# Patient Record
Sex: FEMALE | Race: BLACK OR AFRICAN AMERICAN | NOT HISPANIC OR LATINO | Employment: UNEMPLOYED | URBAN - METROPOLITAN AREA
[De-identification: names, ages, dates, MRNs, and addresses within clinical notes are randomized per-mention and may not be internally consistent; named-entity substitution may affect disease eponyms.]

---

## 2023-10-18 ENCOUNTER — PREP FOR PROCEDURE (OUTPATIENT)
Dept: GYNECOLOGIC ONCOLOGY | Facility: HOSPITAL | Age: 28
End: 2023-10-18

## 2023-10-18 ENCOUNTER — HOSPITAL ENCOUNTER (INPATIENT)
Facility: HOSPITAL | Age: 28
LOS: 3 days | Discharge: HOME | End: 2023-10-21
Attending: EMERGENCY MEDICINE | Admitting: OBSTETRICS & GYNECOLOGY
Payer: MEDICAID

## 2023-10-18 ENCOUNTER — APPOINTMENT (OUTPATIENT)
Dept: RADIOLOGY | Facility: HOSPITAL | Age: 28
End: 2023-10-18
Payer: MEDICAID

## 2023-10-18 DIAGNOSIS — O00.80: ICD-10-CM

## 2023-10-18 DIAGNOSIS — O00.80: Primary | ICD-10-CM

## 2023-10-18 DIAGNOSIS — O00.80 OTHER ECTOPIC PREGNANCY WITHOUT INTRAUTERINE PREGNANCY (HHS-HCC): Primary | ICD-10-CM

## 2023-10-18 LAB
ABO GROUP (TYPE) IN BLOOD: NORMAL
ALBUMIN SERPL BCP-MCNC: 4.2 G/DL (ref 3.4–5)
ALP SERPL-CCNC: 80 U/L (ref 33–110)
ALT SERPL W P-5'-P-CCNC: 10 U/L (ref 7–45)
ANION GAP SERPL CALC-SCNC: 15 MMOL/L (ref 10–20)
ANTIBODY SCREEN: NORMAL
APPEARANCE UR: ABNORMAL
AST SERPL W P-5'-P-CCNC: 29 U/L (ref 9–39)
B-HCG SERPL-ACNC: 8277 MIU/ML
BASOPHILS # BLD AUTO: 0.05 X10*3/UL (ref 0–0.1)
BASOPHILS NFR BLD AUTO: 0.5 %
BILIRUB SERPL-MCNC: 0.5 MG/DL (ref 0–1.2)
BILIRUB UR STRIP.AUTO-MCNC: NEGATIVE MG/DL
BUN SERPL-MCNC: 12 MG/DL (ref 6–23)
CALCIUM SERPL-MCNC: 8.8 MG/DL (ref 8.6–10.6)
CHLORIDE SERPL-SCNC: 105 MMOL/L (ref 98–107)
CO2 SERPL-SCNC: 19 MMOL/L (ref 21–32)
COLOR UR: YELLOW
CREAT SERPL-MCNC: 0.56 MG/DL (ref 0.5–1.05)
EOSINOPHIL # BLD AUTO: 0.22 X10*3/UL (ref 0–0.7)
EOSINOPHIL NFR BLD AUTO: 2.3 %
ERYTHROCYTE [DISTWIDTH] IN BLOOD BY AUTOMATED COUNT: 12.3 % (ref 11.5–14.5)
GFR SERPL CREATININE-BSD FRML MDRD: >90 ML/MIN/1.73M*2
GLUCOSE SERPL-MCNC: 106 MG/DL (ref 74–99)
GLUCOSE UR STRIP.AUTO-MCNC: NEGATIVE MG/DL
HCT VFR BLD AUTO: 33.7 % (ref 36–46)
HGB BLD-MCNC: 11.6 G/DL (ref 12–16)
IMM GRANULOCYTES # BLD AUTO: 0.03 X10*3/UL (ref 0–0.7)
IMM GRANULOCYTES NFR BLD AUTO: 0.3 % (ref 0–0.9)
KETONES UR STRIP.AUTO-MCNC: NEGATIVE MG/DL
LEUKOCYTE ESTERASE UR QL STRIP.AUTO: ABNORMAL
LYMPHOCYTES # BLD AUTO: 2.85 X10*3/UL (ref 1.2–4.8)
LYMPHOCYTES NFR BLD AUTO: 29.4 %
MCH RBC QN AUTO: 30.8 PG (ref 26–34)
MCHC RBC AUTO-ENTMCNC: 34.4 G/DL (ref 32–36)
MCV RBC AUTO: 89 FL (ref 80–100)
MONOCYTES # BLD AUTO: 0.72 X10*3/UL (ref 0.1–1)
MONOCYTES NFR BLD AUTO: 7.4 %
MUCOUS THREADS #/AREA URNS AUTO: ABNORMAL /LPF
NEUTROPHILS # BLD AUTO: 5.84 X10*3/UL (ref 1.2–7.7)
NEUTROPHILS NFR BLD AUTO: 60.1 %
NITRITE UR QL STRIP.AUTO: NEGATIVE
NRBC BLD-RTO: 0 /100 WBCS (ref 0–0)
PH UR STRIP.AUTO: 6 [PH]
PLATELET # BLD AUTO: 241 X10*3/UL (ref 150–450)
PMV BLD AUTO: 10.2 FL (ref 7.5–11.5)
POTASSIUM SERPL-SCNC: 4.3 MMOL/L (ref 3.5–5.3)
POTASSIUM SERPL-SCNC: 7.6 MMOL/L (ref 3.5–5.3)
PROT SERPL-MCNC: 7.6 G/DL (ref 6.4–8.2)
PROT UR STRIP.AUTO-MCNC: NEGATIVE MG/DL
RBC # BLD AUTO: 3.77 X10*6/UL (ref 4–5.2)
RBC # UR STRIP.AUTO: NEGATIVE /UL
RBC #/AREA URNS AUTO: ABNORMAL /HPF
RH FACTOR (ANTIGEN D): NORMAL
SODIUM SERPL-SCNC: 131 MMOL/L (ref 136–145)
SP GR UR STRIP.AUTO: 1.03
SQUAMOUS #/AREA URNS AUTO: ABNORMAL /HPF
UROBILINOGEN UR STRIP.AUTO-MCNC: <2 MG/DL
WBC # BLD AUTO: 9.7 X10*3/UL (ref 4.4–11.3)
WBC #/AREA URNS AUTO: ABNORMAL /HPF

## 2023-10-18 PROCEDURE — 99285 EMERGENCY DEPT VISIT HI MDM: CPT | Performed by: EMERGENCY MEDICINE

## 2023-10-18 PROCEDURE — 36415 COLL VENOUS BLD VENIPUNCTURE: CPT | Mod: CMCLAB | Performed by: EMERGENCY MEDICINE

## 2023-10-18 PROCEDURE — 76817 TRANSVAGINAL US OBSTETRIC: CPT | Performed by: RADIOLOGY

## 2023-10-18 PROCEDURE — 76817 TRANSVAGINAL US OBSTETRIC: CPT

## 2023-10-18 PROCEDURE — 84702 CHORIONIC GONADOTROPIN TEST: CPT | Performed by: PHYSICIAN ASSISTANT

## 2023-10-18 PROCEDURE — 85025 COMPLETE CBC W/AUTO DIFF WBC: CPT | Mod: CMCLAB | Performed by: PHYSICIAN ASSISTANT

## 2023-10-18 PROCEDURE — 96360 HYDRATION IV INFUSION INIT: CPT

## 2023-10-18 PROCEDURE — 81001 URINALYSIS AUTO W/SCOPE: CPT | Mod: CMCLAB | Performed by: PHYSICIAN ASSISTANT

## 2023-10-18 PROCEDURE — 2500000004 HC RX 250 GENERAL PHARMACY W/ HCPCS (ALT 636 FOR OP/ED): Performed by: PHYSICIAN ASSISTANT

## 2023-10-18 PROCEDURE — 36415 COLL VENOUS BLD VENIPUNCTURE: CPT | Mod: CMCLAB | Performed by: PHYSICIAN ASSISTANT

## 2023-10-18 PROCEDURE — 86901 BLOOD TYPING SEROLOGIC RH(D): CPT | Performed by: PHYSICIAN ASSISTANT

## 2023-10-18 PROCEDURE — 1210000001 HC SEMI-PRIVATE ROOM DAILY

## 2023-10-18 PROCEDURE — 87086 URINE CULTURE/COLONY COUNT: CPT | Performed by: PHYSICIAN ASSISTANT

## 2023-10-18 PROCEDURE — 99285 EMERGENCY DEPT VISIT HI MDM: CPT | Performed by: PHYSICIAN ASSISTANT

## 2023-10-18 PROCEDURE — 80053 COMPREHEN METABOLIC PANEL: CPT | Performed by: PHYSICIAN ASSISTANT

## 2023-10-18 PROCEDURE — 84132 ASSAY OF SERUM POTASSIUM: CPT | Performed by: EMERGENCY MEDICINE

## 2023-10-18 RX ORDER — GABAPENTIN 600 MG/1
600 TABLET ORAL ONCE
Status: CANCELLED | OUTPATIENT
Start: 2023-10-18 | End: 2023-10-18

## 2023-10-18 RX ORDER — CELECOXIB 50 MG/1
400 CAPSULE ORAL ONCE
Status: CANCELLED | OUTPATIENT
Start: 2023-10-18 | End: 2023-10-18

## 2023-10-18 RX ORDER — ONDANSETRON HYDROCHLORIDE 2 MG/ML
4 INJECTION, SOLUTION INTRAVENOUS EVERY 6 HOURS PRN
Status: DISCONTINUED | OUTPATIENT
Start: 2023-10-18 | End: 2023-10-21 | Stop reason: HOSPADM

## 2023-10-18 RX ORDER — SODIUM CHLORIDE, SODIUM LACTATE, POTASSIUM CHLORIDE, CALCIUM CHLORIDE 600; 310; 30; 20 MG/100ML; MG/100ML; MG/100ML; MG/100ML
40 INJECTION, SOLUTION INTRAVENOUS CONTINUOUS
Status: DISCONTINUED | OUTPATIENT
Start: 2023-10-18 | End: 2023-10-21 | Stop reason: HOSPADM

## 2023-10-18 RX ORDER — ACETAMINOPHEN 325 MG/1
650 TABLET ORAL EVERY 4 HOURS PRN
Status: DISCONTINUED | OUTPATIENT
Start: 2023-10-18 | End: 2023-10-20

## 2023-10-18 RX ORDER — ACETAMINOPHEN 325 MG/1
975 TABLET ORAL ONCE
Status: CANCELLED | OUTPATIENT
Start: 2023-10-18 | End: 2023-10-18

## 2023-10-18 RX ADMIN — SODIUM CHLORIDE, SODIUM LACTATE, POTASSIUM CHLORIDE, AND CALCIUM CHLORIDE 1000 ML: 600; 310; 30; 20 INJECTION, SOLUTION INTRAVENOUS at 21:50

## 2023-10-18 ASSESSMENT — COLUMBIA-SUICIDE SEVERITY RATING SCALE - C-SSRS
2. HAVE YOU ACTUALLY HAD ANY THOUGHTS OF KILLING YOURSELF?: NO
1. IN THE PAST MONTH, HAVE YOU WISHED YOU WERE DEAD OR WISHED YOU COULD GO TO SLEEP AND NOT WAKE UP?: NO
6. HAVE YOU EVER DONE ANYTHING, STARTED TO DO ANYTHING, OR PREPARED TO DO ANYTHING TO END YOUR LIFE?: NO

## 2023-10-19 ENCOUNTER — ANESTHESIA (OUTPATIENT)
Dept: OPERATING ROOM | Facility: HOSPITAL | Age: 28
End: 2023-10-19
Payer: MEDICAID

## 2023-10-19 ENCOUNTER — ANESTHESIA EVENT (OUTPATIENT)
Dept: OPERATING ROOM | Facility: HOSPITAL | Age: 28
End: 2023-10-19
Payer: MEDICAID

## 2023-10-19 DIAGNOSIS — O00.80 OTHER ECTOPIC PREGNANCY WITHOUT INTRAUTERINE PREGNANCY (HHS-HCC): Primary | ICD-10-CM

## 2023-10-19 LAB
ALBUMIN SERPL BCP-MCNC: 3.9 G/DL (ref 3.4–5)
ALP SERPL-CCNC: 63 U/L (ref 33–110)
ALT SERPL W P-5'-P-CCNC: 6 U/L (ref 7–45)
ANION GAP SERPL CALC-SCNC: 17 MMOL/L (ref 10–20)
AST SERPL W P-5'-P-CCNC: 10 U/L (ref 9–39)
B-HCG SERPL-ACNC: 5857 MIU/ML
BILIRUB SERPL-MCNC: 0.5 MG/DL (ref 0–1.2)
BUN SERPL-MCNC: 8 MG/DL (ref 6–23)
CALCIUM SERPL-MCNC: 8.6 MG/DL (ref 8.6–10.6)
CARDIAC TROPONIN I PNL SERPL HS: <3 NG/L (ref 0–34)
CHLORIDE SERPL-SCNC: 106 MMOL/L (ref 98–107)
CO2 SERPL-SCNC: 19 MMOL/L (ref 21–32)
CREAT SERPL-MCNC: 0.65 MG/DL (ref 0.5–1.05)
ERYTHROCYTE [DISTWIDTH] IN BLOOD BY AUTOMATED COUNT: 13.3 % (ref 11.5–14.5)
GFR SERPL CREATININE-BSD FRML MDRD: >90 ML/MIN/1.73M*2
GLUCOSE BLD MANUAL STRIP-MCNC: 138 MG/DL (ref 74–99)
GLUCOSE SERPL-MCNC: 148 MG/DL (ref 74–99)
HCT VFR BLD AUTO: 32.6 % (ref 36–46)
HGB BLD-MCNC: 10.3 G/DL (ref 12–16)
MCH RBC QN AUTO: 30.2 PG (ref 26–34)
MCHC RBC AUTO-ENTMCNC: 31.6 G/DL (ref 32–36)
MCV RBC AUTO: 96 FL (ref 80–100)
NRBC BLD-RTO: 0 /100 WBCS (ref 0–0)
PLATELET # BLD AUTO: 235 X10*3/UL (ref 150–450)
PMV BLD AUTO: 9.9 FL (ref 7.5–11.5)
POTASSIUM SERPL-SCNC: 3.4 MMOL/L (ref 3.5–5.3)
PROT SERPL-MCNC: 6.2 G/DL (ref 6.4–8.2)
RBC # BLD AUTO: 3.41 X10*6/UL (ref 4–5.2)
SODIUM SERPL-SCNC: 139 MMOL/L (ref 136–145)
WBC # BLD AUTO: 13.5 X10*3/UL (ref 4.4–11.3)

## 2023-10-19 PROCEDURE — 3600000003 HC OR TIME - INITIAL BASE CHARGE - PROCEDURE LEVEL THREE: Performed by: OBSTETRICS & GYNECOLOGY

## 2023-10-19 PROCEDURE — 1210000001 HC SEMI-PRIVATE ROOM DAILY

## 2023-10-19 PROCEDURE — 3700000001 HC GENERAL ANESTHESIA TIME - INITIAL BASE CHARGE: Performed by: OBSTETRICS & GYNECOLOGY

## 2023-10-19 PROCEDURE — A4217 STERILE WATER/SALINE, 500 ML: HCPCS | Performed by: OBSTETRICS & GYNECOLOGY

## 2023-10-19 PROCEDURE — 84702 CHORIONIC GONADOTROPIN TEST: CPT | Performed by: STUDENT IN AN ORGANIZED HEALTH CARE EDUCATION/TRAINING PROGRAM

## 2023-10-19 PROCEDURE — 3600000008 HC OR TIME - EACH INCREMENTAL 1 MINUTE - PROCEDURE LEVEL THREE: Performed by: OBSTETRICS & GYNECOLOGY

## 2023-10-19 PROCEDURE — 2500000004 HC RX 250 GENERAL PHARMACY W/ HCPCS (ALT 636 FOR OP/ED): Performed by: ANESTHESIOLOGY

## 2023-10-19 PROCEDURE — 2780000003 HC OR 278 NO HCPCS: Performed by: OBSTETRICS & GYNECOLOGY

## 2023-10-19 PROCEDURE — 2500000004 HC RX 250 GENERAL PHARMACY W/ HCPCS (ALT 636 FOR OP/ED)

## 2023-10-19 PROCEDURE — 88302 TISSUE EXAM BY PATHOLOGIST: CPT | Mod: TC,SUR | Performed by: OBSTETRICS & GYNECOLOGY

## 2023-10-19 PROCEDURE — 96372 THER/PROPH/DIAG INJ SC/IM: CPT | Performed by: OBSTETRICS & GYNECOLOGY

## 2023-10-19 PROCEDURE — 7100000002 HC RECOVERY ROOM TIME - EACH INCREMENTAL 1 MINUTE: Performed by: OBSTETRICS & GYNECOLOGY

## 2023-10-19 PROCEDURE — 88302 TISSUE EXAM BY PATHOLOGIST: CPT | Performed by: PATHOLOGY

## 2023-10-19 PROCEDURE — P9045 ALBUMIN (HUMAN), 5%, 250 ML: HCPCS | Mod: JZ | Performed by: NURSE ANESTHETIST, CERTIFIED REGISTERED

## 2023-10-19 PROCEDURE — 2500000004 HC RX 250 GENERAL PHARMACY W/ HCPCS (ALT 636 FOR OP/ED): Mod: JZ | Performed by: NURSE ANESTHETIST, CERTIFIED REGISTERED

## 2023-10-19 PROCEDURE — 85027 COMPLETE CBC AUTOMATED: CPT

## 2023-10-19 PROCEDURE — 2500000004 HC RX 250 GENERAL PHARMACY W/ HCPCS (ALT 636 FOR OP/ED): Performed by: OBSTETRICS & GYNECOLOGY

## 2023-10-19 PROCEDURE — 2500000005 HC RX 250 GENERAL PHARMACY W/O HCPCS: Performed by: NURSE ANESTHETIST, CERTIFIED REGISTERED

## 2023-10-19 PROCEDURE — 3700000002 HC GENERAL ANESTHESIA TIME - EACH INCREMENTAL 1 MINUTE: Performed by: OBSTETRICS & GYNECOLOGY

## 2023-10-19 PROCEDURE — 82947 ASSAY GLUCOSE BLOOD QUANT: CPT | Mod: CMCLAB

## 2023-10-19 PROCEDURE — C1765 ADHESION BARRIER: HCPCS | Performed by: OBSTETRICS & GYNECOLOGY

## 2023-10-19 PROCEDURE — 10T24ZZ RESECTION OF PRODUCTS OF CONCEPTION, ECTOPIC, PERCUTANEOUS ENDOSCOPIC APPROACH: ICD-10-PCS | Performed by: OBSTETRICS & GYNECOLOGY

## 2023-10-19 PROCEDURE — 7100000001 HC RECOVERY ROOM TIME - INITIAL BASE CHARGE: Performed by: OBSTETRICS & GYNECOLOGY

## 2023-10-19 PROCEDURE — 84484 ASSAY OF TROPONIN QUANT: CPT | Mod: CMCLAB | Performed by: STUDENT IN AN ORGANIZED HEALTH CARE EDUCATION/TRAINING PROGRAM

## 2023-10-19 PROCEDURE — 88305 TISSUE EXAM BY PATHOLOGIST: CPT | Performed by: PATHOLOGY

## 2023-10-19 PROCEDURE — 2720000007 HC OR 272 NO HCPCS: Performed by: OBSTETRICS & GYNECOLOGY

## 2023-10-19 PROCEDURE — A4216 STERILE WATER/SALINE, 10 ML: HCPCS | Performed by: OBSTETRICS & GYNECOLOGY

## 2023-10-19 PROCEDURE — A59151 PR RX ECTOP PREG BY SCOPE,RMV TUBE/OVRY: Performed by: ANESTHESIOLOGY

## 2023-10-19 PROCEDURE — 93010 ELECTROCARDIOGRAM REPORT: CPT | Performed by: INTERNAL MEDICINE

## 2023-10-19 PROCEDURE — 2500000001 HC RX 250 WO HCPCS SELF ADMINISTERED DRUGS (ALT 637 FOR MEDICARE OP): Performed by: OBSTETRICS & GYNECOLOGY

## 2023-10-19 PROCEDURE — 36415 COLL VENOUS BLD VENIPUNCTURE: CPT | Mod: CMCLAB | Performed by: STUDENT IN AN ORGANIZED HEALTH CARE EDUCATION/TRAINING PROGRAM

## 2023-10-19 PROCEDURE — 59151 TREAT ECTOPIC PREGNANCY: CPT | Performed by: OBSTETRICS & GYNECOLOGY

## 2023-10-19 PROCEDURE — 0UT64ZZ RESECTION OF LEFT FALLOPIAN TUBE, PERCUTANEOUS ENDOSCOPIC APPROACH: ICD-10-PCS | Performed by: OBSTETRICS & GYNECOLOGY

## 2023-10-19 PROCEDURE — 2500000005 HC RX 250 GENERAL PHARMACY W/O HCPCS: Performed by: OBSTETRICS & GYNECOLOGY

## 2023-10-19 PROCEDURE — 80053 COMPREHEN METABOLIC PANEL: CPT

## 2023-10-19 PROCEDURE — A59151 PR RX ECTOP PREG BY SCOPE,RMV TUBE/OVRY: Performed by: NURSE ANESTHETIST, CERTIFIED REGISTERED

## 2023-10-19 PROCEDURE — 99140 ANES COMP EMERGENCY COND: CPT | Performed by: ANESTHESIOLOGY

## 2023-10-19 PROCEDURE — 2500000001 HC RX 250 WO HCPCS SELF ADMINISTERED DRUGS (ALT 637 FOR MEDICARE OP)

## 2023-10-19 DEVICE — ABSORBABLE ADHESION BARRIER
Type: IMPLANTABLE DEVICE | Site: UTERUS | Status: FUNCTIONAL
Brand: GYNECARE INTERCEED

## 2023-10-19 RX ORDER — FENTANYL CITRATE 50 UG/ML
25 INJECTION, SOLUTION INTRAMUSCULAR; INTRAVENOUS EVERY 5 MIN PRN
Status: DISCONTINUED | OUTPATIENT
Start: 2023-10-19 | End: 2023-10-19 | Stop reason: HOSPADM

## 2023-10-19 RX ORDER — ROCURONIUM BROMIDE 10 MG/ML
INJECTION, SOLUTION INTRAVENOUS AS NEEDED
Status: DISCONTINUED | OUTPATIENT
Start: 2023-10-19 | End: 2023-10-19

## 2023-10-19 RX ORDER — POLYETHYLENE GLYCOL 3350 17 G/17G
17 POWDER, FOR SOLUTION ORAL DAILY
Qty: 510 G | Refills: 0 | OUTPATIENT
Start: 2023-10-19

## 2023-10-19 RX ORDER — SODIUM CHLORIDE, SODIUM LACTATE, POTASSIUM CHLORIDE, CALCIUM CHLORIDE 600; 310; 30; 20 MG/100ML; MG/100ML; MG/100ML; MG/100ML
100 INJECTION, SOLUTION INTRAVENOUS CONTINUOUS
Status: DISCONTINUED | OUTPATIENT
Start: 2023-10-19 | End: 2023-10-19 | Stop reason: HOSPADM

## 2023-10-19 RX ORDER — ESMOLOL HYDROCHLORIDE 10 MG/ML
INJECTION INTRAVENOUS AS NEEDED
Status: DISCONTINUED | OUTPATIENT
Start: 2023-10-19 | End: 2023-10-19

## 2023-10-19 RX ORDER — SODIUM CHLORIDE 9 MG/ML
INJECTION, SOLUTION INTRAMUSCULAR; INTRAVENOUS; SUBCUTANEOUS AS NEEDED
Status: DISCONTINUED | OUTPATIENT
Start: 2023-10-19 | End: 2023-10-19 | Stop reason: HOSPADM

## 2023-10-19 RX ORDER — PROPOFOL 10 MG/ML
INJECTION, EMULSION INTRAVENOUS AS NEEDED
Status: DISCONTINUED | OUTPATIENT
Start: 2023-10-19 | End: 2023-10-19

## 2023-10-19 RX ORDER — KETOROLAC TROMETHAMINE 15 MG/ML
INJECTION, SOLUTION INTRAMUSCULAR; INTRAVENOUS AS NEEDED
Status: DISCONTINUED | OUTPATIENT
Start: 2023-10-19 | End: 2023-10-19

## 2023-10-19 RX ORDER — VASOPRESSIN 20 U/ML
INJECTION PARENTERAL CONTINUOUS PRN
Status: COMPLETED | OUTPATIENT
Start: 2023-10-19 | End: 2023-10-19

## 2023-10-19 RX ORDER — GABAPENTIN 600 MG/1
600 TABLET ORAL ONCE
Status: COMPLETED | OUTPATIENT
Start: 2023-10-19 | End: 2023-10-19

## 2023-10-19 RX ORDER — HYDROMORPHONE HYDROCHLORIDE 1 MG/ML
INJECTION, SOLUTION INTRAMUSCULAR; INTRAVENOUS; SUBCUTANEOUS AS NEEDED
Status: DISCONTINUED | OUTPATIENT
Start: 2023-10-19 | End: 2023-10-19

## 2023-10-19 RX ORDER — LIDOCAINE HYDROCHLORIDE 10 MG/ML
INJECTION INFILTRATION; PERINEURAL AS NEEDED
Status: DISCONTINUED | OUTPATIENT
Start: 2023-10-19 | End: 2023-10-19 | Stop reason: HOSPADM

## 2023-10-19 RX ORDER — OXYCODONE HYDROCHLORIDE 5 MG/1
5 TABLET ORAL EVERY 6 HOURS PRN
Qty: 10 TABLET | Refills: 0 | OUTPATIENT
Start: 2023-10-19

## 2023-10-19 RX ORDER — CELECOXIB 200 MG/1
400 CAPSULE ORAL ONCE
Status: COMPLETED | OUTPATIENT
Start: 2023-10-19 | End: 2023-10-19

## 2023-10-19 RX ORDER — LIDOCAINE HYDROCHLORIDE 10 MG/ML
0.1 INJECTION, SOLUTION EPIDURAL; INFILTRATION; INTRACAUDAL; PERINEURAL ONCE
Status: DISCONTINUED | OUTPATIENT
Start: 2023-10-19 | End: 2023-10-19 | Stop reason: HOSPADM

## 2023-10-19 RX ORDER — MIDAZOLAM HYDROCHLORIDE 1 MG/ML
INJECTION, SOLUTION INTRAMUSCULAR; INTRAVENOUS AS NEEDED
Status: DISCONTINUED | OUTPATIENT
Start: 2023-10-19 | End: 2023-10-19

## 2023-10-19 RX ORDER — ONDANSETRON HYDROCHLORIDE 2 MG/ML
4 INJECTION, SOLUTION INTRAVENOUS ONCE AS NEEDED
Status: DISCONTINUED | OUTPATIENT
Start: 2023-10-19 | End: 2023-10-19 | Stop reason: HOSPADM

## 2023-10-19 RX ORDER — DOXYCYCLINE 100 MG/10ML
INJECTION, POWDER, LYOPHILIZED, FOR SOLUTION INTRAVENOUS AS NEEDED
Status: DISCONTINUED | OUTPATIENT
Start: 2023-10-19 | End: 2023-10-19

## 2023-10-19 RX ORDER — ACETAMINOPHEN 325 MG/1
975 TABLET ORAL ONCE
Status: COMPLETED | OUTPATIENT
Start: 2023-10-19 | End: 2023-10-19

## 2023-10-19 RX ORDER — HYDROMORPHONE HYDROCHLORIDE 1 MG/ML
0.5 INJECTION, SOLUTION INTRAMUSCULAR; INTRAVENOUS; SUBCUTANEOUS EVERY 5 MIN PRN
Status: DISCONTINUED | OUTPATIENT
Start: 2023-10-19 | End: 2023-10-19 | Stop reason: HOSPADM

## 2023-10-19 RX ORDER — OXYCODONE HYDROCHLORIDE 5 MG/1
5 TABLET ORAL EVERY 4 HOURS PRN
Status: DISCONTINUED | OUTPATIENT
Start: 2023-10-19 | End: 2023-10-19 | Stop reason: HOSPADM

## 2023-10-19 RX ORDER — FENTANYL CITRATE 50 UG/ML
INJECTION, SOLUTION INTRAMUSCULAR; INTRAVENOUS AS NEEDED
Status: DISCONTINUED | OUTPATIENT
Start: 2023-10-19 | End: 2023-10-19

## 2023-10-19 RX ORDER — IBUPROFEN 600 MG/1
600 TABLET ORAL EVERY 6 HOURS PRN
Qty: 28 TABLET | Refills: 0 | OUTPATIENT
Start: 2023-10-19 | End: 2023-10-26

## 2023-10-19 RX ORDER — ALBUMIN HUMAN 50 G/1000ML
SOLUTION INTRAVENOUS AS NEEDED
Status: DISCONTINUED | OUTPATIENT
Start: 2023-10-19 | End: 2023-10-19

## 2023-10-19 RX ORDER — ACETAMINOPHEN 325 MG/1
1000 TABLET ORAL EVERY 6 HOURS PRN
Qty: 30 TABLET | Refills: 0 | OUTPATIENT
Start: 2023-10-19 | End: 2023-10-26

## 2023-10-19 RX ORDER — LIDOCAINE HYDROCHLORIDE 20 MG/ML
INJECTION, SOLUTION INFILTRATION; PERINEURAL AS NEEDED
Status: DISCONTINUED | OUTPATIENT
Start: 2023-10-19 | End: 2023-10-19

## 2023-10-19 RX ORDER — SODIUM CHLORIDE 0.9 G/100ML
IRRIGANT IRRIGATION AS NEEDED
Status: DISCONTINUED | OUTPATIENT
Start: 2023-10-19 | End: 2023-10-19 | Stop reason: HOSPADM

## 2023-10-19 RX ADMIN — DOXYCYCLINE 200 MG: 100 INJECTION, POWDER, LYOPHILIZED, FOR SOLUTION INTRAVENOUS at 09:55

## 2023-10-19 RX ADMIN — ACETAMINOPHEN 975 MG: 325 TABLET ORAL at 07:44

## 2023-10-19 RX ADMIN — HYDROMORPHONE HYDROCHLORIDE 0.5 MG: 1 INJECTION, SOLUTION INTRAMUSCULAR; INTRAVENOUS; SUBCUTANEOUS at 11:55

## 2023-10-19 RX ADMIN — SODIUM CHLORIDE, POTASSIUM CHLORIDE, SODIUM LACTATE AND CALCIUM CHLORIDE 40 ML/HR: 600; 310; 30; 20 INJECTION, SOLUTION INTRAVENOUS at 04:42

## 2023-10-19 RX ADMIN — LIDOCAINE HYDROCHLORIDE 100 MG: 20 INJECTION, SOLUTION INFILTRATION; PERINEURAL at 08:14

## 2023-10-19 RX ADMIN — GABAPENTIN 600 MG: 600 TABLET, FILM COATED ORAL at 07:45

## 2023-10-19 RX ADMIN — HYDROMORPHONE HYDROCHLORIDE 0.5 MG: 1 INJECTION, SOLUTION INTRAMUSCULAR; INTRAVENOUS; SUBCUTANEOUS at 12:33

## 2023-10-19 RX ADMIN — SODIUM CHLORIDE, SODIUM LACTATE, POTASSIUM CHLORIDE, AND CALCIUM CHLORIDE: 600; 310; 30; 20 INJECTION, SOLUTION INTRAVENOUS at 08:19

## 2023-10-19 RX ADMIN — ROCURONIUM BROMIDE 5 MG: 10 INJECTION, SOLUTION INTRAVENOUS at 10:02

## 2023-10-19 RX ADMIN — SUGAMMADEX 200 MG: 100 INJECTION, SOLUTION INTRAVENOUS at 10:59

## 2023-10-19 RX ADMIN — ALBUMIN (HUMAN) 250 ML: 12.5 SOLUTION INTRAVENOUS at 08:41

## 2023-10-19 RX ADMIN — CELECOXIB 400 MG: 200 CAPSULE ORAL at 07:44

## 2023-10-19 RX ADMIN — SODIUM CHLORIDE, POTASSIUM CHLORIDE, SODIUM LACTATE AND CALCIUM CHLORIDE 100 ML/HR: 600; 310; 30; 20 INJECTION, SOLUTION INTRAVENOUS at 11:23

## 2023-10-19 RX ADMIN — ESMOLOL HYDROCHLORIDE 10 MG: 10 INJECTION, SOLUTION INTRAVENOUS at 08:31

## 2023-10-19 RX ADMIN — FENTANYL CITRATE 50 MCG: 50 INJECTION, SOLUTION INTRAMUSCULAR; INTRAVENOUS at 08:14

## 2023-10-19 RX ADMIN — MIDAZOLAM 2 MG: 1 INJECTION INTRAMUSCULAR; INTRAVENOUS at 08:01

## 2023-10-19 RX ADMIN — ACETAMINOPHEN 650 MG: 325 TABLET ORAL at 19:53

## 2023-10-19 RX ADMIN — FENTANYL CITRATE 50 MCG: 50 INJECTION, SOLUTION INTRAMUSCULAR; INTRAVENOUS at 08:13

## 2023-10-19 RX ADMIN — SODIUM CHLORIDE 1000 ML: 0.9 INJECTION, SOLUTION INTRAVENOUS at 14:04

## 2023-10-19 RX ADMIN — PROPOFOL 200 MG: 10 INJECTION, EMULSION INTRAVENOUS at 08:14

## 2023-10-19 RX ADMIN — ONDANSETRON 4 MG: 2 INJECTION INTRAMUSCULAR; INTRAVENOUS at 10:11

## 2023-10-19 RX ADMIN — ROCURONIUM BROMIDE 50 MG: 10 INJECTION, SOLUTION INTRAVENOUS at 08:16

## 2023-10-19 RX ADMIN — ESMOLOL HYDROCHLORIDE 10 MG: 10 INJECTION, SOLUTION INTRAVENOUS at 10:28

## 2023-10-19 RX ADMIN — HYDROMORPHONE HYDROCHLORIDE 0.2 MG: 1 INJECTION, SOLUTION INTRAMUSCULAR; INTRAVENOUS; SUBCUTANEOUS at 11:14

## 2023-10-19 RX ADMIN — DEXAMETHASONE SODIUM PHOSPHATE 4 MG: 4 INJECTION, SOLUTION INTRAMUSCULAR; INTRAVENOUS at 08:44

## 2023-10-19 RX ADMIN — SODIUM CHLORIDE, SODIUM LACTATE, POTASSIUM CHLORIDE, AND CALCIUM CHLORIDE: 600; 310; 30; 20 INJECTION, SOLUTION INTRAVENOUS at 07:53

## 2023-10-19 RX ADMIN — HYDROMORPHONE HYDROCHLORIDE 0.2 MG: 1 INJECTION, SOLUTION INTRAMUSCULAR; INTRAVENOUS; SUBCUTANEOUS at 10:51

## 2023-10-19 RX ADMIN — KETOROLAC TROMETHAMINE 15 MG: 15 INJECTION, SOLUTION INTRAMUSCULAR; INTRAVENOUS at 09:37

## 2023-10-19 SDOH — SOCIAL STABILITY: SOCIAL INSECURITY: ARE THERE ANY APPARENT SIGNS OF INJURIES/BEHAVIORS THAT COULD BE RELATED TO ABUSE/NEGLECT?: NO

## 2023-10-19 SDOH — SOCIAL STABILITY: SOCIAL INSECURITY: HAS ANYONE EVER THREATENED TO HURT YOUR FAMILY OR YOUR PETS?: NO

## 2023-10-19 SDOH — SOCIAL STABILITY: SOCIAL INSECURITY: ABUSE: ADULT

## 2023-10-19 SDOH — SOCIAL STABILITY: SOCIAL INSECURITY: DOES ANYONE TRY TO KEEP YOU FROM HAVING/CONTACTING OTHER FRIENDS OR DOING THINGS OUTSIDE YOUR HOME?: NO

## 2023-10-19 SDOH — SOCIAL STABILITY: SOCIAL INSECURITY: ARE YOU OR HAVE YOU BEEN THREATENED OR ABUSED PHYSICALLY, EMOTIONALLY, OR SEXUALLY BY ANYONE?: NO

## 2023-10-19 SDOH — SOCIAL STABILITY: SOCIAL INSECURITY: WERE YOU ABLE TO COMPLETE ALL THE BEHAVIORAL HEALTH SCREENINGS?: YES

## 2023-10-19 SDOH — SOCIAL STABILITY: SOCIAL INSECURITY: HAVE YOU HAD THOUGHTS OF HARMING ANYONE ELSE?: NO

## 2023-10-19 SDOH — SOCIAL STABILITY: SOCIAL INSECURITY: DO YOU FEEL ANYONE HAS EXPLOITED OR TAKEN ADVANTAGE OF YOU FINANCIALLY OR OF YOUR PERSONAL PROPERTY?: NO

## 2023-10-19 SDOH — SOCIAL STABILITY: SOCIAL INSECURITY: DO YOU FEEL UNSAFE GOING BACK TO THE PLACE WHERE YOU ARE LIVING?: NO

## 2023-10-19 ASSESSMENT — PAIN SCALES - GENERAL
PAINLEVEL_OUTOF10: 0 - NO PAIN
PAINLEVEL_OUTOF10: 7
PAINLEVEL_OUTOF10: 7
PAINLEVEL_OUTOF10: 0 - NO PAIN
PAINLEVEL_OUTOF10: 4
PAINLEVEL_OUTOF10: 9
PAIN_LEVEL: 3
PAINLEVEL_OUTOF10: 0 - NO PAIN
PAINLEVEL_OUTOF10: 7
PAINLEVEL_OUTOF10: 7

## 2023-10-19 ASSESSMENT — ENCOUNTER SYMPTOMS
VOMITING: 0
WEAKNESS: 0
AGITATION: 0
LIGHT-HEADEDNESS: 0
NAUSEA: 0
CONFUSION: 0
ABDOMINAL PAIN: 0
SHORTNESS OF BREATH: 0
DIZZINESS: 0
BACK PAIN: 0
ABDOMINAL DISTENTION: 0

## 2023-10-19 ASSESSMENT — PAIN - FUNCTIONAL ASSESSMENT
PAIN_FUNCTIONAL_ASSESSMENT: 0-10

## 2023-10-19 ASSESSMENT — LIFESTYLE VARIABLES
HOW OFTEN DO YOU HAVE 6 OR MORE DRINKS ON ONE OCCASION: NEVER
SKIP TO QUESTIONS 9-10: 1
HOW MANY STANDARD DRINKS CONTAINING ALCOHOL DO YOU HAVE ON A TYPICAL DAY: PATIENT DOES NOT DRINK
HOW OFTEN DO YOU HAVE A DRINK CONTAINING ALCOHOL: NEVER
AUDIT-C TOTAL SCORE: 0
AUDIT-C TOTAL SCORE: 0

## 2023-10-19 ASSESSMENT — COGNITIVE AND FUNCTIONAL STATUS - GENERAL
DAILY ACTIVITIY SCORE: 24
PATIENT BASELINE BEDBOUND: NO

## 2023-10-19 ASSESSMENT — ACTIVITIES OF DAILY LIVING (ADL)
HEARING - LEFT EAR: FUNCTIONAL
ADEQUATE_TO_COMPLETE_ADL: YES
WALKS IN HOME: INDEPENDENT
LACK_OF_TRANSPORTATION: NO
HEARING - RIGHT EAR: FUNCTIONAL
PATIENT'S MEMORY ADEQUATE TO SAFELY COMPLETE DAILY ACTIVITIES?: YES
BATHING: INDEPENDENT
GROOMING: INDEPENDENT
JUDGMENT_ADEQUATE_SAFELY_COMPLETE_DAILY_ACTIVITIES: YES
FEEDING YOURSELF: INDEPENDENT
DRESSING YOURSELF: INDEPENDENT
TOILETING: INDEPENDENT

## 2023-10-19 ASSESSMENT — COLUMBIA-SUICIDE SEVERITY RATING SCALE - C-SSRS
6. HAVE YOU EVER DONE ANYTHING, STARTED TO DO ANYTHING, OR PREPARED TO DO ANYTHING TO END YOUR LIFE?: NO
2. HAVE YOU ACTUALLY HAD ANY THOUGHTS OF KILLING YOURSELF?: NO
1. IN THE PAST MONTH, HAVE YOU WISHED YOU WERE DEAD OR WISHED YOU COULD GO TO SLEEP AND NOT WAKE UP?: NO

## 2023-10-19 ASSESSMENT — PATIENT HEALTH QUESTIONNAIRE - PHQ9
2. FEELING DOWN, DEPRESSED OR HOPELESS: NOT AT ALL
1. LITTLE INTEREST OR PLEASURE IN DOING THINGS: NOT AT ALL
SUM OF ALL RESPONSES TO PHQ9 QUESTIONS 1 & 2: 0

## 2023-10-19 ASSESSMENT — PAIN DESCRIPTION - DESCRIPTORS: DESCRIPTORS: CRAMPING;SORE

## 2023-10-19 NOTE — ANESTHESIA POSTPROCEDURE EVALUATION
Patient: Dori Marroquin    Procedure Summary       Date: 10/19/23 Room / Location: [facility] OR 03 / [facility] OR    Anesthesia Start: 0757 Anesthesia Stop:     Procedure: Operatvie laparoscopy, Wedge resection for Ectopic Pregnancy, Left Salpingectomy, D&C (Left: Abdomen) Diagnosis:       Ectopic pregnancy of intramural myometrium      (Ectopic pregnancy of intramural myometrium [O00.80])    Surgeons: Paulette Damon MD Responsible Provider: Milton Majano MD    Anesthesia Type: general ASA Status: 2 - Emergent            Anesthesia Type: general    Vitals Value Taken Time   /62 10/19/23 1119   Temp 37.2 °C (99 °F) 10/19/23 1119   Pulse 113 10/19/23 1119   Resp 20 10/19/23 1119   SpO2 100 % 10/19/23 1119       Anesthesia Post Evaluation    Patient location during evaluation: bedside  Patient participation: complete - patient participated  Level of consciousness: awake and alert  Pain score: 3  Pain management: adequate  Airway patency: patent  Cardiovascular status: acceptable  Respiratory status: acceptable  Hydration status: acceptable        No notable events documented.

## 2023-10-19 NOTE — SIGNIFICANT EVENT
Rapid response   10/19/23 1402   Onset Documentation   Rapid Response Initiated By Rapid response RN   Pager Time 1402   Arrival Time 1408   Event End Time 1430   Primary Reason for Call Change in mental status;Staff concern     Rapid response called for concern of mental status- pt just returned from the OR where she received general anesthesia and pain med following. Per bedside nurse she was lethargic and slow to arouse, not answering questions. Upon arrival pt resting in bed and appears pale and shaking- warm blankets placed. Pt more awake and following commands and answering questions appropriately. Primary team at the bedside-pt receiving fluid bolus, labs obtained and pending and 12 lead ECG done. Pt slowly more awake. VSS-see flow sheet. Pt okay to remain on the floor for continued monitoring and will call with any addtl concerns.

## 2023-10-19 NOTE — DISCHARGE INSTRUCTIONS
Post-Operation Instructions  What care is needed at home?  Ask your doctor what you need to do when you go home. Make sure you ask questions if you do not understand what you need to do.  You can take off the bandaid covering your incision in 1-2 days. The skin tape covering your other incisions will fall off on its own or your Doctor will remove it for you.  You can shower, but do not scrub your incisions, let warm soapy water flow over them.  Do not lift things over 10 pounds (4.5 kg).  Do not put anything in your vagina until cleared by your provider: no sex, douching, tampons.  Be sure to wash your hands before and after touching your wound or dressing.  You can expect some bleeding from your vagina for a few weeks. You may use sanitary pads but not tampons.  Your bowel movements may take some time to get back to normal. Eat small meals high in fiber to avoid hard stools. Drink 6 to 8 glasses of water each day.  Try to walk each day. Start by walking a little more than you did the day before. Walking boosts blood flow and helps prevent lung, belly, and blood problems.  Talk with your doctor about safe sex as you can still be exposed to sexually transmitted diseases.  What follow-up care is needed?  We will call you to schedule a follow up visit with your surgeon. Be sure to keep your visits.  You have stitches. They will dissolve on their own. The surgical glue will fall off on its own as well.  What drugs may be needed?  We are sending you home with ibuprofen, tylenol, oxycodone (for pain), and miralax (a stool softener).   What problems could happen?  Infection  Wound opening  Heavy blood loss  Blood clots in your legs or lungs  Damage to your bowel, bladder, and other organs inside the belly  Trouble passing bowel movements  When do I need to call the doctor?  Signs of infection such as a fever of 100.4°F (38°C) or higher, chills, pain with passing urine.  Signs of wound infection such as swelling, redness,  warmth around the wound; too much pain when touched; yellowish, greenish, or bloody discharge; foul smell coming from the cut site; cut site opens up.  Excessive blood in your sanitary pads or more than six soaked pads in a day. (Spotting is normal).  Smelly green or dark yellow vaginal discharge  Upset stomach, throwing up, or very bad belly pain  Pain not helped by drugs you are taking  No bowel movement after 3 days  If you feel the need to pass urine but urine will not come out even after 6 hours  Swelling in your leg or arm that is much greater on one side than the other  Teach Back: Helping You Understand  The Teach Back Method helps you understand the information we are giving you. After you talk with the staff, tell them in your own words what you learned. This helps to make sure the staff has described each thing clearly. It also helps to explain things that may have been confusing. Before going home, make sure you can do these:  I can tell you about my procedure.  I can tell you how to care for my cut site.  I can tell you what I will do if I have a fever, chills, bad smelling drainage from the vagina, or bad belly pain.

## 2023-10-19 NOTE — OP NOTE
Date: 10/19/2023  OR Location: [facility] OR    Name: Dori Marroquin, : 1995, Age: 28 y.o., MRN: [id number], Sex: female    Diagnosis  Pre-op Diagnosis     * Ectopic pregnancy of intramural myometrium [O00.80] Post-op Diagnosis     * Ectopic pregnancy of intramural myometrium [O00.80]     Procedures  Operative Laparoscopy  Wedge resection of left cornual ectopic pregnancy  Left Salpingectomy  Dilation and curettage under laparoscopic guidance  Intraoperative ultrasound    Surgeons      * Paulette Damon - Primary     * Bonnie Kumar    Resident/Fellow/Other Assistant:  Surgeon(s) and Role:     * Tim Park MD - Assisting     * Radha Frye MD - Assisting     * Bonnie Kumar MD    Procedure Summary  Anesthesia: General  ASA: II  Anesthesia Staff: Anesthesiologist: Milton Majano MD  CRNA: Caroline Grimaldo, APRN-CRNA; Lynne Moore APRN-CRNA  Estimated Blood Loss: 10mL  Intra-op Medications:   Medication Name Total Dose   acetaminophen (Tylenol) tablet 975 mg 975 mg   celecoxib (CeleBREX) capsule 400 mg 400 mg   gabapentin (Neurontin) tablet 600 mg 600 mg              Anesthesia Record               Intraprocedure I/O Totals          Intake    .00 mL    Esmolol Drip 0.00 mL    The total shown is the total volume documented since Anesthesia Start was filed.    Total Intake 900 mL       Output    Urine 60 mL    Est. Blood Loss 10 mL    Total Output 70 mL       Net    Net Volume 830 mL          Specimen:   ID Type Source Tests Collected by Time   1 : LEFT FALLOPIAN TUBE Tissue FALLOPIAN TUBE SALPINGECTOMY LEFT SURGICAL PATHOLOGY EXAM Paulette Damon MD 10/19/2023 0923   2 : Cornual ectopic Tissue ECTOPIC PRODUCTS OF CONCEPTION (SPECIFY LOCATION) SURGICAL PATHOLOGY EXAM Paulette Damon MD 10/19/2023 0928   3 : Endometrial curettings Tissue PRODUCTS OF CONCEPTION SURGICAL PATHOLOGY EXAM Paulette Damno MD 10/19/2023 1019        Staff:   Circulator: Brittny Piper RN; Isabella Shipley RN;  Mimi Gutierrez RN  Scrub Person: Ragini Guzman    Indication: Patient was referred from  after presenting for care and had ultrasound findings concerning for cornual ectopic pregnancy measuring 6.5wga. Hcg 8277. Patient was asymptomatic without clinical findings concerning for ruptured ectopic.  Risks were discussed the patient, including bleeding, infection, and injury to surrounding structures.  Surgical consent was obtained.     Findings: Approximately 2-3cm left cornual ectopic pregnancy, at insertion site of left fallopian tube, without signs of rupture. Intraoperative transvaginal ultrasound confirmed presence of left cornual ectopic pregnancy. Grossly normal appearing right fallopian tube and bilateral ovaries.    Procedure Details: The patient was brought to the OR.  A time out was performed and general anesthesia was administered. The patient was then placed in the dorsal lithotomy position. Next, the patient was prepped and draped in the normal sterile fashion. A Castle catheter was placed. A sponge stick was placed in the vagina for manipulation.     The abdomen was entered without difficulty using open Zazueta technique and a 10mm trocar was placed. The camera was inserted and intraperitoneal placement confirmed.  Pneumoperitoneum was established with CO2 gas to a pressure of 18mmHg. An intraabdominal survey revealed normal appearing anatomy an no visceral or vascular injury. The patient was placed in steep Trendelenburg position to facilitate visualization in the pelvis. Three additional 5mm trocars were then inserted into the abdomen under direct visualization: two in the right lower quadrant and one in the left lower quadrant.     The uterus was elevated and carefully examined. The ectopic pregnancy was identified at the left uterine cornu, within close proximity to the left fallopian tube. There was no hemoperitoneum or signs of rupture. It was noted that the ectopic pregnancy was situated at  the junction of the left fallopian tube and the uterine cornu.  The right fallopian tube and bilateral ovaries appeared normal.      Dr. Park was called into the OR to assist with intraoperative ultrasound to further delineate the location of the ectopic. Intraoperative transvaginal ultrasound confirmed the presence of a left cornual ectopic pregnancy.     Because of its close proximity to the cornual ectopic, the decision was made to remove the left fallopian tube. The left fallopian tube was grasped at its fimbriated end. The underlying mesosalpinx was bisected with the ligasure devise starting at the fimbriated end and transected at the cornua. The left fallopian tube was then removed from the abdomen and sent to pathology for permanent section.     Attention was then turned to the left uterine cornu. 20 mL of dilute vasopressin was carefully injected into the myometrium surrounding the ectopic. Using the laparoscopic Bovie, the myometrium surrounding the ectopic was incised and cauterized until the left uterine cornu containing the pregnancy had been . Excellent hemostasis was noted.     The camera was exchanged for a 5-port and an endo-catch bag was used to remove the specimen containing the ectopic pregnancy from the umbilical port under direct visualization. The specimen was then sent to pathology for permanent section.     The sponge stick in the vagina was then removed. A speculum was placed in the vagina and a single tooth tenaculum was placed on the anterior lip of the cervix. The cervix was serially dilated using Colmenares dilators up to 21 Armenian.  A size 7 mm curved suction curette was connected to the suction, placed in the cervix and a suction curettage was performed under laparoscopic guidance. Minimal tissue was evacuated. The endometrial curettings were collected and sent to pathology for permanent section.     Attention was returned to the abdomen after donning new sterile gloves. The uterus  was again noted to be hemostatic. The resected area was copiously irrigated. The uterine defect was then closed in two layers. The myometrium was closed using a running stitch of 2-0 V-loc suture. The serosal layer was also closed using a running stitch of 2-0 V-loc suture. All needles were removed from the abdomen.  Interceed was placed over the left uterine cornu to reduce the risk of adhesion formation.      A final survey of the abdomen showed hemostasis. All port sites were removed.    The fascia was closed with 0-Vicryl with a figure of eight stitch and the skin incisions were closed with 4-0 Monocryl.    The patient tolerated the procedure well and all counts were correct x2. Dr. Damon was present for the entire procedure. The patient was taken to the PACU in stable condition. She will be admitted post-op with plan to repeat a serum hcg level today, tomorrow, and likely weekly until her hcg trends to zero. She will be followed in the beta book. She was counseled that pregnancy is not recommended for 6 months, and that in future pregnancies she will require delivery via  section.       Complications:  None; patient tolerated the procedure well.     Disposition: PACU - hemodynamically stable.  Condition: stable      I was present for the entirety of the procedure(s).     Paulette Damon MD

## 2023-10-19 NOTE — H&P
Interval Surgical Update  28 year old  presenting from  for concern for ectopic pregnancy found to have confirmed with [facility] TVUS for live ectopic pregnancy within the myometrium of the uterine fundus, outside of the endometrium. Adding on for surgical management this morning.    Physical examination stable from previous.   HDS at this time.  T&S 2u.    Discussed with Dr. Damon.  Rina Garrido MD PGY-1 OB/GYN     ---------------------------------------------------------------------  History Of Present Illness  28 year old  presenting from  for concern for ectopic pregnancy.     Patient reports that she presented to [facility] on 10/7 in setting positive home pregnancy test and vaginal bleeding/cramping x1 day. bHCG found to be 916, no GS visualized on TVUS. bHCG repeated 48 hours later and found to be 1976. She then presented to [facility] on 10/18 for an ultrasound and was subsequently sent to [facility] given concern for an ectopic pregnancy. On admission, she rated her pain a 0/10 and reports no vaginal bleeding at this time. Patient also denies CP, SOB, N/V. TVUS obtained on admission significant for interstitial ectopic pregnancy at 6.5 wga with minimal free fluid, FHR visualized. Beta HCG obtained and found to be 8277.     OBHx: surgical elective AB in   GYN hx: Denies h/o STIs and abnml pap smears   PMH: scoliosis (no rods)    PSH: primary   Allergies: pineapple  Fam hx: DM, HTN  Meds: none  Social hx: breast feeding, denied t/e/i     Review of Systems   Respiratory:  Negative for shortness of breath.    Cardiovascular:  Negative for chest pain.   Gastrointestinal:  Negative for abdominal distention, abdominal pain, nausea and vomiting.   Genitourinary:  Negative for vaginal bleeding.   Musculoskeletal:  Negative for back pain.   Neurological:  Negative for dizziness, weakness and light-headedness.   Psychiatric/Behavioral:  Negative for agitation and confusion.          Physical Exam  Constitutional:       General: She is not in acute distress.  HENT:      Head: Atraumatic.   Eyes:      Extraocular Movements: Extraocular movements intact.   Cardiovascular:      Rate and Rhythm: Normal rate and regular rhythm.   Pulmonary:      Effort: Pulmonary effort is normal. No respiratory distress.   Abdominal:      General: Abdomen is flat. There is no distension.      Palpations: Abdomen is soft.      Tenderness: There is no abdominal tenderness.   Musculoskeletal:         General: Normal range of motion.      Cervical back: Normal range of motion.   Skin:     General: Skin is warm.   Neurological:      General: No focal deficit present.      Mental Status: She is alert.   Psychiatric:         Behavior: Behavior normal.      Comments: Anxious mood          Last Recorded Vitals  Blood pressure 111/75, pulse 85, temperature 37.2 °C (99 °F), resp. rate 17, last menstrual period 2023, SpO2 100 %.    Lab Results   Component Value Date    WBC 9.7 10/18/2023    HGB 11.6 (L) 10/18/2023    HCT 33.7 (L) 10/18/2023    MCV 89 10/18/2023     10/18/2023       US OB transvaginal    Result Date: 10/18/2023  IMPRESSION:   Live ectopic pregnancy within the myometrium of the uterine fundus, outside of the endometrium, corresponding to a gestational age of 6 weeks, 5 days. There is a small amount of fluid in the left adnexa adjacent to the ectopic pregnancy with early signs of rupture not excluded. Recommend OB gyn consultation.      Assessment/Plan   28 year old  at 6.5wga by pelvic ultrasound (10/18) presenting from  for concern for ectopic pregnancy. TVUS obtained on presentation pertinent for live ectopic pregnancy, therefore patient admitted for surgical management.     Ectopic pregnancy  - TVUS (10/18) concerning for live ectopic pregnancy within the myometrium of uterine fundus with small amount of fluid in the left adnexa adjacent to the [facility]  - Given  hemodynamic stability, lack of VB and abdominal discomfort, decision was made for surgical management in AM for availability of full clinical staff given morbidity of surgery in s/o location of ectopic pregnancy. However will plan for OR sooner if clinical status changes prior to scheduled case   - Risks of surgery discussed with patient and all questions and concerns addressed at that time   - Preop Hgb 11.6, T&C x2u pRBCs   - Rh positive, therefore Rhogam not indicated   - PRN Tylenol for pain management, however pt reporting 0/10 pain at this time   - NPO   - LR @ 40cc/hr     Dispo: operative laparoscopy for surgical resection of ectopic pregnancy scheduled with MD Marisol in AM      Patient seen and d/w Dr. Blakely   Gyn pager [phone]  Ashley Braun MD, PGY -2     I saw and evaluated the patient. I personally obtained the key and critical portions of the history and physical exam or was physically present for key and critical portions performed by the resident/fellow. I reviewed the resident/fellow's documentation and discussed the patient with the resident/fellow. I agree with the resident/fellow's medical decision making as documented in the note with the exception/addition of the following:    Updated by overnight gyn coverage with plan for surgical management of likely cornual ectopic.  Images and clinical course to date reviewed.    Counselled patient on risks/benefits of surgery, likelihood of removal of fallopian tube, but possibility of pregnancy in the future.    All questions/concerns addressed.  Surgical consent obtained.      Paulette Damon MD

## 2023-10-19 NOTE — SIGNIFICANT EVENT
Rapid Response    Called by bedside RN at approx 1400, stated patient was minimally responsive and requested evaluation. On arrival to room, patient on toilet. Appeared pale in face, was minimally responsive (did not answer to orientation questions, could not state whether she was in pain) with eyes closed. Low muscle tone noted, was being supported by nurses on either side. Moved patient carefully back to bed. Abdominal exam benign: mildly tender throughout but no rebound, guarding, peritonitic signs. Pt started to respond more during time of abdominal exam. POC BG obtained, 130s. EKG with NSR. Vital signs stable throughout episode (see vitals below), satting appropriately on RA, not tachycardic. 1 L LR bolus started. Rapid response team also present. Patient became more responsive, able to state she only has pain with palpation of abdomen and feels sleepy but otherwise normal, other ROS negative. Pt was last given dilaudid and toradol at 1200 in PACU.    Called back to room at 1445 for chest pain, which patient states is sharp and in her epigastric/substernal area. Pt also complaining of sore throat. ROS otherwise negative. Lungs CTAB, heart with RRR, costochondral joints and sternum nontender to palpation, epigastric area nontender to palpation. EKG repeated, NSR with mild tachycardia in 100-110. Rest of vitals stable.     Assessment: Decreased responsiveness is now resolved, was most likely 2/2 anesthesia and analgesics given in perioperative setting. Vasovagal episode also possible. Chest pain likely 2/2 gas pain and/or recent intubation (also complaining of sore throat). ACS unlikely given normal EKG and lack of risk factors but will obtain troponin.    Plan:  -Cont 1L LR bolus, then maintenance IVF  -CBC, CMP, bHCG, troponin sent, will f/up results  -Cont to monitor vitals    Pt seen and d/w Dr. Marisol Lee MD PGY-2  GYN, pager [phone]

## 2023-10-19 NOTE — INDIVIDUALIZED OVERALL PLAN OF CARE NOTE
Dori Marroquin is a 28 y.o. female on day 1 of admission presenting with Other ectopic pregnancy without intrauterine pregnancy.    Subjective   Able to get up and out of bed with assistance to ambulate + use restroom. Feeling less dizzy. Abdominal pain only when ambulating, otherwise tolerating ok. Denies n/v, wants to try eating.        Objective     Constitutional: Well developed, awake/alert/oriented x3, no distress, alert and cooperative  Eyes: clear sclera  Head/Neck: Normocephalic  Respiratory/Thorax: Normal respiratory effort on RA, CTAB   Cardiovascular: warm and well perfused   Gastrointestinal: soft, nondistended, appropriately tender to palpation, without rebound or guarding, laparoscopic incisions covered in clean dry bandages, no strikethrough   Musculoskeletal: grossly normal ROM  Extremities: No LE tenderness with palpation  Neurological: CN II - XII grossly intact  Psychological: Appropriate mood and behavior  Skin: warm, dry, no lesions      Last Recorded Vitals  Blood pressure 123/76, pulse 83, temperature 36.8 °C (98.2 °F), resp. rate 20, weight 45 kg (99 lb 3.3 oz), last menstrual period 05/13/2023, SpO2 97 %, currently breastfeeding.  Intake/Output last 3 Shifts:  I/O last 3 completed shifts:  In: -   Out: 200 [Urine:200]    Relevant Results  Results for orders placed or performed during the hospital encounter of 10/18/23 (from the past 24 hour(s))   Urinalysis with Reflex Microscopic and Culture   Result Value Ref Range    Color, Urine Yellow Straw, Yellow    Appearance, Urine Hazy (N) Clear    Specific Gravity, Urine 1.029 1.005 - 1.035    pH, Urine 6.0 5.0, 5.5, 6.0, 6.5, 7.0, 7.5, 8.0    Protein, Urine NEGATIVE NEGATIVE mg/dL    Glucose, Urine NEGATIVE NEGATIVE mg/dL    Blood, Urine NEGATIVE NEGATIVE    Ketones, Urine NEGATIVE NEGATIVE mg/dL    Bilirubin, Urine NEGATIVE NEGATIVE    Urobilinogen, Urine <2.0 <2.0 mg/dL    Nitrite, Urine NEGATIVE NEGATIVE    Leukocyte Esterase, Urine LARGE  (3+) (A) NEGATIVE   Urinalysis Microscopic Only   Result Value Ref Range    WBC, Urine 21-50 (A) 1-5, NONE /HPF    RBC, Urine 3-5 NONE, 1-2, 3-5 /HPF    Squamous Epithelial Cells, Urine 10-25 (FEW) Reference range not established. /HPF    Mucus, Urine 2+ Reference range not established. /LPF   CBC and Auto Differential   Result Value Ref Range    WBC 9.7 4.4 - 11.3 x10*3/uL    nRBC 0.0 0.0 - 0.0 /100 WBCs    RBC 3.77 (L) 4.00 - 5.20 x10*6/uL    Hemoglobin 11.6 (L) 12.0 - 16.0 g/dL    Hematocrit 33.7 (L) 36.0 - 46.0 %    MCV 89 80 - 100 fL    MCH 30.8 26.0 - 34.0 pg    MCHC 34.4 32.0 - 36.0 g/dL    RDW 12.3 11.5 - 14.5 %    Platelets 241 150 - 450 x10*3/uL    MPV 10.2 7.5 - 11.5 fL    Neutrophils % 60.1 40.0 - 80.0 %    Immature Granulocytes %, Automated 0.3 0.0 - 0.9 %    Lymphocytes % 29.4 13.0 - 44.0 %    Monocytes % 7.4 2.0 - 10.0 %    Eosinophils % 2.3 0.0 - 6.0 %    Basophils % 0.5 0.0 - 2.0 %    Neutrophils Absolute 5.84 1.20 - 7.70 x10*3/uL    Immature Granulocytes Absolute, Automated 0.03 0.00 - 0.70 x10*3/uL    Lymphocytes Absolute 2.85 1.20 - 4.80 x10*3/uL    Monocytes Absolute 0.72 0.10 - 1.00 x10*3/uL    Eosinophils Absolute 0.22 0.00 - 0.70 x10*3/uL    Basophils Absolute 0.05 0.00 - 0.10 x10*3/uL   Comprehensive metabolic panel   Result Value Ref Range    Glucose 106 (H) 74 - 99 mg/dL    Sodium 131 (L) 136 - 145 mmol/L    Potassium 7.6 (HH) 3.5 - 5.3 mmol/L    Chloride 105 98 - 107 mmol/L    Bicarbonate 19 (L) 21 - 32 mmol/L    Anion Gap 15 10 - 20 mmol/L    Urea Nitrogen 12 6 - 23 mg/dL    Creatinine 0.56 0.50 - 1.05 mg/dL    eGFR >90 >60 mL/min/1.73m*2    Calcium 8.8 8.6 - 10.6 mg/dL    Albumin 4.2 3.4 - 5.0 g/dL    Alkaline Phosphatase 80 33 - 110 U/L    Total Protein 7.6 6.4 - 8.2 g/dL    AST 29 9 - 39 U/L    Bilirubin, Total 0.5 0.0 - 1.2 mg/dL    ALT 10 7 - 45 U/L   Type And Screen   Result Value Ref Range    ABO TYPE O     Rh TYPE POS     ANTIBODY SCREEN NEG    hCG, quantitative, pregnancy    Result Value Ref Range    HCG, Beta-Quantitative 8,277 (H) <5 mIU/mL   Potassium   Result Value Ref Range    Potassium 4.3 3.5 - 5.3 mmol/L   hCG, quantitative, pregnancy   Result Value Ref Range    HCG, Beta-Quantitative 5,857 (H) <5 mIU/mL   Comprehensive Metabolic Panel   Result Value Ref Range    Glucose 148 (H) 74 - 99 mg/dL    Sodium 139 136 - 145 mmol/L    Potassium 3.4 (L) 3.5 - 5.3 mmol/L    Chloride 106 98 - 107 mmol/L    Bicarbonate 19 (L) 21 - 32 mmol/L    Anion Gap 17 10 - 20 mmol/L    Urea Nitrogen 8 6 - 23 mg/dL    Creatinine 0.65 0.50 - 1.05 mg/dL    eGFR >90 >60 mL/min/1.73m*2    Calcium 8.6 8.6 - 10.6 mg/dL    Albumin 3.9 3.4 - 5.0 g/dL    Alkaline Phosphatase 63 33 - 110 U/L    Total Protein 6.2 (L) 6.4 - 8.2 g/dL    AST 10 9 - 39 U/L    Bilirubin, Total 0.5 0.0 - 1.2 mg/dL    ALT 6 (L) 7 - 45 U/L   POCT GLUCOSE   Result Value Ref Range    POCT Glucose 138 (H) 74 - 99 mg/dL   CBC   Result Value Ref Range    WBC 13.5 (H) 4.4 - 11.3 x10*3/uL    nRBC 0.0 0.0 - 0.0 /100 WBCs    RBC 3.41 (L) 4.00 - 5.20 x10*6/uL    Hemoglobin 10.3 (L) 12.0 - 16.0 g/dL    Hematocrit 32.6 (L) 36.0 - 46.0 %    MCV 96 80 - 100 fL    MCH 30.2 26.0 - 34.0 pg    MCHC 31.6 (L) 32.0 - 36.0 g/dL    RDW 13.3 11.5 - 14.5 %    Platelets 235 150 - 450 x10*3/uL    MPV 9.9 7.5 - 11.5 fL               Assessment/Plan   Principal Problem:    Other ectopic pregnancy without intrauterine pregnancy  Active Problems:    Ectopic pregnancy of intramural myometrium    28y POD#0 from laparoscopic wedge resection of cornual ectopic pregnancy, L salpingectomy, D&C.     Cornual ectopic  - hcg 8277 preop -> 5857 POD#0  - POD#1 repeat hcg ordered, will likely recommend trending weekly serum hcgs until zero  - discussed with patient recommendation against pregnancy for 6 months  - discussed methods of contraception, patient undecided currently, continue to address    Neuro:   - pain control with PO pain meds: acetaminophen, ibuprofen, oxycodone and  IV toradol  - lidoderm patch; abdominal binder PRN  - rapid response called POD#0 from lethargy, see event note for details. Vitals stable, exam reassuring, mental status improved once brought back to bed. Hgb stable, CMP wnl. Likely 2/2 anesthesia and analgesics given postop.     CV/Heme:  - hemodynamically stable  - preop hgb 11.6 -> procedure EBL 10, POD#0 hcg 10.3    Pulm:   - stable on RA  - encourage incentive spirometry 10x/hr while awake    FEN/GI:   - regular diet  - IVF LR at 40cc/hr  - IV zofran PRN for nausea  - bowel regimen ordered    :   - voiding  - strict I/Os     DVT Prophylaxis:  - SCDs, ambulation    Dispo: Anticipate d/c on POD#1 if meeting all postoperative milestones    D/w Dr. Marisol Kumar MD PGY-4

## 2023-10-19 NOTE — ANESTHESIA PREPROCEDURE EVALUATION
Patient: Dori Marroquin    Evaluation Method: In-person visit    Procedure Information       Date/Time: 10/19/23 0715    Procedure: Surgical Intervention Laparoscopy Ectopic Pregnancy (Left)    Location: [facility] OR  / [facility] OR    Surgeons: Paulette Damon MD            Relevant Problems   No relevant active problems       Clinical information reviewed:    Allergies  Meds               NPO Detail:  NPO/Void Status  Date of Last Liquid: 10/18/23  Date of Last Solid: 10/18/23         OB/GYN     Physical Exam    Airway  Mallampati: I  TM distance: >3 FB  Neck ROM: full     Cardiovascular    Dental    Pulmonary    Abdominal          Anesthesia Plan    ASA 2 - emergent     general     intravenous induction   Anesthetic plan and risks discussed with patient and father.      PT is NPO. Plan GETA, second IV after induction. Procedure explained. Risks discussed, she agrees to proceed.

## 2023-10-19 NOTE — ANESTHESIA PROCEDURE NOTES
Airway  Date/Time: 10/19/2023 8:15 AM  Urgency: elective      Staffing  Performed: CRNA   Authorized by: Milton Majano MD    Performed by: DARRYL Lazo-IVY  Patient location during procedure: OR    Indications and Patient Condition  Indications for airway management: anesthesia  Preoxygenated: yes  Mask difficulty assessment: 1 - vent by mask    Final Airway Details  Final airway type: endotracheal airway      Successful airway: ETT  Cuffed: yes   Successful intubation technique: direct laryngoscopy  ETT size (mm): 7.0  Cormack-Lehane Classification: grade I - full view of glottis  Placement verified by: chest auscultation and capnometry   Measured from: lips  ETT to lips (cm): 20  Number of attempts at approach: 1    Additional Comments  Atramatic,   lips and teeth in pre op condition.  Soft bite block placed

## 2023-10-19 NOTE — ED PROVIDER NOTES
HPI:  28-year-old female G3, P1 approximately 6 weeks pregnant from LMP  presents with concerns for ectopic pregnancy.  States she was at  on [address] [place] earlier today and had ultrasound performed which showed signs of ectopic pregnancy and was told to present to ED for evaluation.  States she has mild vaginal spotting with no pain.  Denies any dysuria or vaginal complaints including discharge.  Denies any abdominal pain, nausea, vomiting, fever, chills, night sweats or rigors.  States her bleeding is very mild spotting without any clots or heavy gush.    Physical Exam:   GEN: Vitals noted. NAD  EYES:  EOMs grossly intact, anicteric sclera  CELY: Mucosa moist.  NECK: Supple.  CARD: RRR  PULMONARY:  Moving air well. Clear all lung fields.  ABDOMEN: No guarding. Soft, nontender.   EXTREMITIES: Full ROM, no pitting edema,   SKIN: Intact, warm and dry  NEURO: Alert and oriented x 3, speech is clear, no obvious deficits noted.       ----------------------------------------------------------------------------------------------------------------------------    MDM:  28-year-old female 6 weeks pregnant G3, P1 presenting with concerns for ectopic pregnancy from ultrasound at [facility] earlier today.  On exam she is well-appearing able to comfortably.  Vital signs stable.  ABD soft NTTP with NABS.  Bedside point-of-care ultrasound performed showed no free fluid, possibly visualized the ectopic however unclear.  Spoke with OB/GYN who would like repeat ultrasound to confirm.  We will check laboratory work and they will see her.  Laboratory work with no leukocytosis or significant anemia.  Does have marked hemolysis with hyperkalemia which we will repeat.  Urinalysis is equivocal given large amount of squamous epithelial cells we will defer treatment.  Blood type came back is O+ therefore no need for RhoGAM at this time.  Transvaginal ultrasound shows signs of cornual ectopic pregnancy with fetal heart  tone.  Fluids are initiated.  I spoke with OB/GYN.  They will see patient.  OB/GYN will be admitting to their service for planned laparoscopic surgery in the morning.  She remained hemodynamically stable with no new complaints.      ----------------------------------------------------------------------------------------------------------------------------    This note was dictated using a speech recognition program.  While an attempt was made at proof reading to minimize errors, minor errors in transcription may be present call for questions.          Awais Maldonado PA-C  10/18/23 5168

## 2023-10-20 ENCOUNTER — APPOINTMENT (OUTPATIENT)
Dept: RADIOLOGY | Facility: HOSPITAL | Age: 28
End: 2023-10-20
Payer: MEDICAID

## 2023-10-20 LAB
ALBUMIN SERPL BCP-MCNC: 3.6 G/DL (ref 3.4–5)
ALP SERPL-CCNC: 54 U/L (ref 33–110)
ALT SERPL W P-5'-P-CCNC: 7 U/L (ref 7–45)
ANION GAP SERPL CALC-SCNC: 13 MMOL/L (ref 10–20)
ANION GAP SERPL CALC-SCNC: 15 MMOL/L (ref 10–20)
APPEARANCE UR: CLEAR
AST SERPL W P-5'-P-CCNC: 11 U/L (ref 9–39)
B-HCG SERPL-ACNC: 2403 MIU/ML
BASOPHILS # BLD AUTO: 0.02 X10*3/UL (ref 0–0.1)
BASOPHILS NFR BLD AUTO: 0.1 %
BILIRUB SERPL-MCNC: 0.5 MG/DL (ref 0–1.2)
BILIRUB UR STRIP.AUTO-MCNC: NEGATIVE MG/DL
BUN SERPL-MCNC: 6 MG/DL (ref 6–23)
BUN SERPL-MCNC: 6 MG/DL (ref 6–23)
CALCIUM SERPL-MCNC: 8.4 MG/DL (ref 8.6–10.6)
CALCIUM SERPL-MCNC: 8.5 MG/DL (ref 8.6–10.6)
CHLORIDE SERPL-SCNC: 109 MMOL/L (ref 98–107)
CHLORIDE SERPL-SCNC: 110 MMOL/L (ref 98–107)
CO2 SERPL-SCNC: 22 MMOL/L (ref 21–32)
CO2 SERPL-SCNC: 22 MMOL/L (ref 21–32)
COLOR UR: YELLOW
CREAT SERPL-MCNC: 0.58 MG/DL (ref 0.5–1.05)
CREAT SERPL-MCNC: 0.71 MG/DL (ref 0.5–1.05)
EOSINOPHIL # BLD AUTO: 0 X10*3/UL (ref 0–0.7)
EOSINOPHIL NFR BLD AUTO: 0 %
ERYTHROCYTE [DISTWIDTH] IN BLOOD BY AUTOMATED COUNT: 13.4 % (ref 11.5–14.5)
ERYTHROCYTE [DISTWIDTH] IN BLOOD BY AUTOMATED COUNT: 13.7 % (ref 11.5–14.5)
GFR SERPL CREATININE-BSD FRML MDRD: >90 ML/MIN/1.73M*2
GFR SERPL CREATININE-BSD FRML MDRD: >90 ML/MIN/1.73M*2
GLUCOSE SERPL-MCNC: 82 MG/DL (ref 74–99)
GLUCOSE SERPL-MCNC: 96 MG/DL (ref 74–99)
GLUCOSE UR STRIP.AUTO-MCNC: NEGATIVE MG/DL
HCT VFR BLD AUTO: 28.1 % (ref 36–46)
HCT VFR BLD AUTO: 30 % (ref 36–46)
HGB BLD-MCNC: 9.3 G/DL (ref 12–16)
HGB BLD-MCNC: 9.6 G/DL (ref 12–16)
HOLD SPECIMEN: NORMAL
IMM GRANULOCYTES # BLD AUTO: 0.06 X10*3/UL (ref 0–0.7)
IMM GRANULOCYTES NFR BLD AUTO: 0.4 % (ref 0–0.9)
KETONES UR STRIP.AUTO-MCNC: NEGATIVE MG/DL
LACTATE SERPL-SCNC: 0.9 MMOL/L (ref 0.4–2)
LEUKOCYTE ESTERASE UR QL STRIP.AUTO: NEGATIVE
LYMPHOCYTES # BLD AUTO: 1.62 X10*3/UL (ref 1.2–4.8)
LYMPHOCYTES NFR BLD AUTO: 11.6 %
MCH RBC QN AUTO: 30.4 PG (ref 26–34)
MCH RBC QN AUTO: 30.9 PG (ref 26–34)
MCHC RBC AUTO-ENTMCNC: 32 G/DL (ref 32–36)
MCHC RBC AUTO-ENTMCNC: 33.1 G/DL (ref 32–36)
MCV RBC AUTO: 93 FL (ref 80–100)
MCV RBC AUTO: 95 FL (ref 80–100)
MONOCYTES # BLD AUTO: 1.37 X10*3/UL (ref 0.1–1)
MONOCYTES NFR BLD AUTO: 9.8 %
NEUTROPHILS # BLD AUTO: 10.84 X10*3/UL (ref 1.2–7.7)
NEUTROPHILS NFR BLD AUTO: 78.1 %
NITRITE UR QL STRIP.AUTO: NEGATIVE
NRBC BLD-RTO: 0 /100 WBCS (ref 0–0)
NRBC BLD-RTO: 0 /100 WBCS (ref 0–0)
PH UR STRIP.AUTO: 7 [PH]
PLATELET # BLD AUTO: 215 X10*3/UL (ref 150–450)
PLATELET # BLD AUTO: 217 X10*3/UL (ref 150–450)
PMV BLD AUTO: 9.7 FL (ref 7.5–11.5)
PMV BLD AUTO: 9.8 FL (ref 7.5–11.5)
POTASSIUM SERPL-SCNC: 3.3 MMOL/L (ref 3.5–5.3)
POTASSIUM SERPL-SCNC: 3.6 MMOL/L (ref 3.5–5.3)
PROT SERPL-MCNC: 5.9 G/DL (ref 6.4–8.2)
PROT UR STRIP.AUTO-MCNC: NEGATIVE MG/DL
RBC # BLD AUTO: 3.01 X10*6/UL (ref 4–5.2)
RBC # BLD AUTO: 3.16 X10*6/UL (ref 4–5.2)
RBC # UR STRIP.AUTO: ABNORMAL /UL
RBC #/AREA URNS AUTO: ABNORMAL /HPF
SODIUM SERPL-SCNC: 141 MMOL/L (ref 136–145)
SODIUM SERPL-SCNC: 143 MMOL/L (ref 136–145)
SP GR UR STRIP.AUTO: 1.01
UROBILINOGEN UR STRIP.AUTO-MCNC: <2 MG/DL
WBC # BLD AUTO: 12.5 X10*3/UL (ref 4.4–11.3)
WBC # BLD AUTO: 13.9 X10*3/UL (ref 4.4–11.3)
WBC #/AREA URNS AUTO: ABNORMAL /HPF

## 2023-10-20 PROCEDURE — 87040 BLOOD CULTURE FOR BACTERIA: CPT | Performed by: STUDENT IN AN ORGANIZED HEALTH CARE EDUCATION/TRAINING PROGRAM

## 2023-10-20 PROCEDURE — 36415 COLL VENOUS BLD VENIPUNCTURE: CPT | Mod: CMCLAB | Performed by: STUDENT IN AN ORGANIZED HEALTH CARE EDUCATION/TRAINING PROGRAM

## 2023-10-20 PROCEDURE — 2500000001 HC RX 250 WO HCPCS SELF ADMINISTERED DRUGS (ALT 637 FOR MEDICARE OP): Performed by: STUDENT IN AN ORGANIZED HEALTH CARE EDUCATION/TRAINING PROGRAM

## 2023-10-20 PROCEDURE — 80048 BASIC METABOLIC PNL TOTAL CA: CPT | Mod: CCI | Performed by: STUDENT IN AN ORGANIZED HEALTH CARE EDUCATION/TRAINING PROGRAM

## 2023-10-20 PROCEDURE — 71046 X-RAY EXAM CHEST 2 VIEWS: CPT | Performed by: RADIOLOGY

## 2023-10-20 PROCEDURE — 80053 COMPREHEN METABOLIC PANEL: CPT | Mod: CMCLAB | Performed by: STUDENT IN AN ORGANIZED HEALTH CARE EDUCATION/TRAINING PROGRAM

## 2023-10-20 PROCEDURE — 2500000004 HC RX 250 GENERAL PHARMACY W/ HCPCS (ALT 636 FOR OP/ED): Performed by: STUDENT IN AN ORGANIZED HEALTH CARE EDUCATION/TRAINING PROGRAM

## 2023-10-20 PROCEDURE — 83605 ASSAY OF LACTIC ACID: CPT | Mod: CMCLAB | Performed by: STUDENT IN AN ORGANIZED HEALTH CARE EDUCATION/TRAINING PROGRAM

## 2023-10-20 PROCEDURE — 85025 COMPLETE CBC W/AUTO DIFF WBC: CPT | Performed by: STUDENT IN AN ORGANIZED HEALTH CARE EDUCATION/TRAINING PROGRAM

## 2023-10-20 PROCEDURE — 99231 SBSQ HOSP IP/OBS SF/LOW 25: CPT | Performed by: STUDENT IN AN ORGANIZED HEALTH CARE EDUCATION/TRAINING PROGRAM

## 2023-10-20 PROCEDURE — 84702 CHORIONIC GONADOTROPIN TEST: CPT | Performed by: STUDENT IN AN ORGANIZED HEALTH CARE EDUCATION/TRAINING PROGRAM

## 2023-10-20 PROCEDURE — 2500000004 HC RX 250 GENERAL PHARMACY W/ HCPCS (ALT 636 FOR OP/ED)

## 2023-10-20 PROCEDURE — 81001 URINALYSIS AUTO W/SCOPE: CPT | Performed by: STUDENT IN AN ORGANIZED HEALTH CARE EDUCATION/TRAINING PROGRAM

## 2023-10-20 PROCEDURE — 85027 COMPLETE CBC AUTOMATED: CPT | Performed by: STUDENT IN AN ORGANIZED HEALTH CARE EDUCATION/TRAINING PROGRAM

## 2023-10-20 PROCEDURE — 71046 X-RAY EXAM CHEST 2 VIEWS: CPT | Mod: FY

## 2023-10-20 PROCEDURE — 2500000001 HC RX 250 WO HCPCS SELF ADMINISTERED DRUGS (ALT 637 FOR MEDICARE OP)

## 2023-10-20 PROCEDURE — 1210000001 HC SEMI-PRIVATE ROOM DAILY

## 2023-10-20 RX ORDER — ACETAMINOPHEN 500 MG
1000 TABLET ORAL EVERY 6 HOURS PRN
Qty: 30 TABLET | Refills: 0 | Status: SHIPPED | OUTPATIENT
Start: 2023-10-20 | End: 2023-11-03 | Stop reason: ALTCHOICE

## 2023-10-20 RX ORDER — OXYCODONE HYDROCHLORIDE 5 MG/1
5 TABLET ORAL EVERY 6 HOURS PRN
Qty: 5 TABLET | Refills: 0 | Status: SHIPPED | OUTPATIENT
Start: 2023-10-20 | End: 2023-11-03 | Stop reason: ALTCHOICE

## 2023-10-20 RX ORDER — SIMETHICONE 80 MG
80 TABLET,CHEWABLE ORAL 2 TIMES DAILY PRN
Status: DISCONTINUED | OUTPATIENT
Start: 2023-10-20 | End: 2023-10-21 | Stop reason: HOSPADM

## 2023-10-20 RX ORDER — IBUPROFEN 600 MG/1
600 TABLET ORAL EVERY 6 HOURS SCHEDULED
Status: DISCONTINUED | OUTPATIENT
Start: 2023-10-20 | End: 2023-10-21 | Stop reason: HOSPADM

## 2023-10-20 RX ORDER — POLYETHYLENE GLYCOL 3350 17 G/17G
17 POWDER, FOR SOLUTION ORAL DAILY
Status: DISCONTINUED | OUTPATIENT
Start: 2023-10-20 | End: 2023-10-21 | Stop reason: HOSPADM

## 2023-10-20 RX ORDER — POLYETHYLENE GLYCOL 3350 17 G/17G
17 POWDER, FOR SOLUTION ORAL DAILY
Qty: 510 G | Refills: 0 | Status: SHIPPED | OUTPATIENT
Start: 2023-10-20 | End: 2023-11-03 | Stop reason: ALTCHOICE

## 2023-10-20 RX ORDER — ACETAMINOPHEN 325 MG/1
975 TABLET ORAL EVERY 6 HOURS
Status: DISCONTINUED | OUTPATIENT
Start: 2023-10-20 | End: 2023-10-21 | Stop reason: HOSPADM

## 2023-10-20 RX ORDER — VANCOMYCIN HYDROCHLORIDE 1 G/200ML
1 INJECTION, SOLUTION INTRAVENOUS ONCE
Status: COMPLETED | OUTPATIENT
Start: 2023-10-20 | End: 2023-10-20

## 2023-10-20 RX ORDER — FAMOTIDINE 20 MG/1
20 TABLET, FILM COATED ORAL 2 TIMES DAILY
Status: DISCONTINUED | OUTPATIENT
Start: 2023-10-20 | End: 2023-10-21 | Stop reason: HOSPADM

## 2023-10-20 RX ORDER — OXYCODONE HYDROCHLORIDE 5 MG/1
5 TABLET ORAL EVERY 4 HOURS PRN
Status: DISCONTINUED | OUTPATIENT
Start: 2023-10-20 | End: 2023-10-21 | Stop reason: HOSPADM

## 2023-10-20 RX ORDER — NORGESTIMATE AND ETHINYL ESTRADIOL 0.25-0.035
1 KIT ORAL DAILY
Qty: 28 TABLET | Refills: 12 | Status: SHIPPED | OUTPATIENT
Start: 2023-10-20 | End: 2024-10-19

## 2023-10-20 RX ORDER — OXYCODONE HYDROCHLORIDE 5 MG/1
10 TABLET ORAL EVERY 6 HOURS PRN
Status: DISCONTINUED | OUTPATIENT
Start: 2023-10-20 | End: 2023-10-21 | Stop reason: HOSPADM

## 2023-10-20 RX ORDER — IBUPROFEN 600 MG/1
600 TABLET ORAL EVERY 6 HOURS PRN
Qty: 60 TABLET | Refills: 0 | Status: SHIPPED | OUTPATIENT
Start: 2023-10-20 | End: 2023-11-03 | Stop reason: ALTCHOICE

## 2023-10-20 RX ADMIN — VANCOMYCIN HYDROCHLORIDE 1 G: 1 INJECTION, SOLUTION INTRAVENOUS at 06:06

## 2023-10-20 RX ADMIN — ACETAMINOPHEN 975 MG: 325 TABLET ORAL at 20:20

## 2023-10-20 RX ADMIN — ACETAMINOPHEN 975 MG: 325 TABLET ORAL at 08:23

## 2023-10-20 RX ADMIN — SIMETHICONE 80 MG: 80 TABLET, CHEWABLE ORAL at 08:24

## 2023-10-20 RX ADMIN — IBUPROFEN 600 MG: 600 TABLET, FILM COATED ORAL at 08:22

## 2023-10-20 RX ADMIN — ACETAMINOPHEN 975 MG: 325 TABLET ORAL at 15:13

## 2023-10-20 RX ADMIN — PIPERACILLIN SODIUM AND TAZOBACTAM SODIUM 4.5 G: 4; .5 INJECTION, SOLUTION INTRAVENOUS at 04:42

## 2023-10-20 RX ADMIN — FAMOTIDINE 20 MG: 20 TABLET ORAL at 10:46

## 2023-10-20 RX ADMIN — ACETAMINOPHEN 975 MG: 325 TABLET ORAL at 02:03

## 2023-10-20 RX ADMIN — IBUPROFEN 600 MG: 600 TABLET, FILM COATED ORAL at 20:20

## 2023-10-20 RX ADMIN — IBUPROFEN 600 MG: 600 TABLET, FILM COATED ORAL at 15:13

## 2023-10-20 RX ADMIN — OXYCODONE HYDROCHLORIDE 5 MG: 5 TABLET ORAL at 12:00

## 2023-10-20 RX ADMIN — IBUPROFEN 600 MG: 600 TABLET, FILM COATED ORAL at 02:57

## 2023-10-20 RX ADMIN — POLYETHYLENE GLYCOL 3350 17 G: 17 POWDER, FOR SOLUTION ORAL at 08:24

## 2023-10-20 RX ADMIN — SIMETHICONE 80 MG: 80 TABLET, CHEWABLE ORAL at 15:18

## 2023-10-20 RX ADMIN — SODIUM CHLORIDE, POTASSIUM CHLORIDE, SODIUM LACTATE AND CALCIUM CHLORIDE 1000 ML: 600; 310; 30; 20 INJECTION, SOLUTION INTRAVENOUS at 02:01

## 2023-10-20 ASSESSMENT — PAIN SCALES - GENERAL
PAINLEVEL_OUTOF10: 3
PAINLEVEL_OUTOF10: 8
PAINLEVEL_OUTOF10: 9
PAINLEVEL_OUTOF10: 5 - MODERATE PAIN
PAINLEVEL_OUTOF10: 3
PAINLEVEL_OUTOF10: 5 - MODERATE PAIN
PAINLEVEL_OUTOF10: 6
PAINLEVEL_OUTOF10: 8

## 2023-10-20 ASSESSMENT — PAIN DESCRIPTION - DESCRIPTORS
DESCRIPTORS: SORE;CRAMPING
DESCRIPTORS: SORE

## 2023-10-20 NOTE — LACTATION NOTE
Lactation Consultant Note  Lactation Consultation  Reason for Consult: Initial assessment  Consultant Name: Margo Jay RN, IBCLC    Maternal Information  Has mother  before?: Yes  How long did the mother previously breastfeed?: currently breastfeeding 10 month old girl  Infant to breast within first 2 hours of birth?:  (N/A)    Maternal Assessment  Breast Assessment:  (not assessed at this time)    Infant Assessment  Infant Behavior:  (N/A)    Feeding Assessment       LATCH TOOL       Breast Pump  Pump: Hospital grade electric pump  Units of Volume: Ounces per session    Other OB Lactation Tools  Lactation Tools: Flanges    Patient Follow-up  Inpatient Lactation Follow-up Needed : No  Lactation Professional - OK to Discharge: Yes    Other OB Lactation Documentation       Recommendations/Summary  Mother was under general anesthesia for ectopic pregnancy yesterday 0700. Mother currently still breastfeeding/pumping for 10 month only girl at home. Mother had questions regarding if breastmilk pumped this morning was safe to give to child at home regarding her procedure yesterday. We discussed that this breastmilk is safe to give to infant and discussed possibility of supply to increase or decrease in this time of transitioning from not being pregnant anymore. Mother states she feels like recently she has had an increase in supply. Mother in need of some more storage for breastmilk, [employer] breastmilk bags supplied to mother. Mother states she mainly breastfeeds infant and pumps when she is away from child plans for possibly being discharged today. Mother had a question regarding introduction of almond milk when child at home turns 1 year old vs. Introducing cows milk. We discussed that mother can still provide breastmilk to child after 1 year old but deferred to her child's pediatrician regarding recommendation of milk substitute to offer.

## 2023-10-20 NOTE — SIGNIFICANT EVENT
Event: Rapid Response    Patient arrived to floor around 1350. RN to room to assess patient, noted patient was very drowsy/weak, but stated she needed to void. Per RN and significant other's assistance, patient able to ambulate to the restroom. At 1403 patient started to become less responsive and was unable to answer RN's questions/could not tell nurse her name. RN called Teresa Romero RN for assistance and obtained vitals which were WNL. Around 1410, primary RN called Bridget Lee MD to notify of situation and to have come evaluate and decided to call a Rapid Response due to patient being unresponsive/lethargic/unable to speak. Both RN's transferred patient to bed and placed in Trendelenburg position. Primary team and rapid response team came to bedside shortly after. Per MD, give patient 1L fluid bolus, draw labs, get a blood sugar, and get an EKG. Patient started to become more aroused and able to answer questions/speak. Per MD patient okay to remain on the floor with continued monitoring.

## 2023-10-20 NOTE — PROGRESS NOTES
Subjective  Out of bed to restroom. Voiding. Passing flatus. Tolerating PO. Pain improved but not fully controlled with PO medication.      Objective   Constitutional: Well developed, awake/alert/oriented x3, no distress, alert and cooperative  Eyes: clear sclera  Head/Neck: Normocephalic  Respiratory/Thorax: Normal respiratory effort on RA, CTAB   Cardiovascular: warm and well perfused   Gastrointestinal: soft, nondistended, appropriately tender to palpation, without rebound or guarding, laparoscopic incisions covered in clean dry bandages, no strikethrough   Musculoskeletal: grossly normal ROM  Extremities: No LE tenderness with palpation  Neurological: CN II - XII grossly intact  Psychological: Appropriate mood and behavior  Skin: warm, dry, no lesions    Vitals:    10/20/23 0600   BP:    Pulse: 86   Resp:    Temp: 37.8 °C (100 °F)   SpO2: 97%      Assessment/Plan   28y POD#1 from laparoscopic wedge resection of cornual ectopic pregnancy, L salpingectomy, D&C.      Cornual ectopic  - hcg 8277 preop -> 5857 POD#0  - POD#1 repeat hcg ordered, will likely recommend trending weekly serum hcgs until zero  - discussed with patient recommendation against pregnancy for 6 months  - discussed methods of contraception, patient undecided currently, continue to address     Neuro:   - pain control with PO pain meds: acetaminophen, ibuprofen, oxycodone and IV toradol  - lidoderm patch; abdominal binder PRN  - rapid response called POD#0 from lethargy, see event note for details. Vitals stable, exam reassuring, mental status improved once brought back to bed. Hgb stable, CMP wnl. Likely 2/2 anesthesia and analgesics given postop.      CV/Heme:  - hemodynamically stable  - preop hgb 11.6 -> procedure EBL 10, POD#0 hcg 10.3     Pulm:   - stable on RA  - encourage incentive spirometry 10x/hr while awake     FEN/GI:   - regular diet  - IVF LR at 40cc/hr  - IV zofran PRN for nausea  - bowel regimen ordered     :   - voiding  -  strict I/Os     ID:  - Met SIRS criteria. In setting of recent surgery and possible infection, will start sepsis workup: CBC w diff, CMP, lactate, BCx, UCx. Will start vanc/zosyn for unknown source of infection. Maintaining BPs, not in shock, will give 1L LR bolus  - Other causes of postop fever considered: given POD#1, fever is more likely 2/2 anesthesia or atelectasis - encouraged incentive spirometer  - Will also send respiratory viral panel  - Continue pain meds as ordered     DVT Prophylaxis:  - SCDs, ambulation     To be seen and discussed with Dr. Danny Armstrong MD

## 2023-10-20 NOTE — SIGNIFICANT EVENT
Called with pt temp 38.2 and . To bedside to evaluate     Subjective  Pt reports feeling warm, body aches. Has urinated, tolerating PO without n/v, has ambulated. No CP/SOB.     Objective  Vitals reviewed  General Appearance: no acute distress  Skin: no rashes or lesions appreciated  Head/Face: NCAT  Eyes: EOMI  Mouth/Throat: MMM  Lungs: normal work of breathing, CTAB  Heart: warm, well perfused, RRR  Abdomen: soft, mildly distended, diffuse moderate TTP, incisions with dressing in place with minimal strikethrough  Extremities: no LE edema appreciated  Musculoskeletal: normal ROM  Neurologic: no gross deficits  Psych exam: normal mood and affect     Assessment/Plan  27yo POD#1 from laparoscopic wedge resection of cornual ectopic pregnancy, L salpingectomy, D&C; now with fever and tachycardia     - Meeting SIRS criteria. In setting of recent surgery and possible infection, will start sepsis workup: CBC w diff, CMP, lactate, BCx, UCx. Will start vanc/zosyn for unknown source of infection. Maintaining BPs, not in shock, will give 1L LR bolus  - Other causes of postop fever considered: given POD#1, fever is more likely 2/2 anesthesia or atelectasis - encouraged incentive spirometer  - Will also send respiratory viral panel  - Continue pain meds as ordered    D/w Dr. Alaniz and Dr. Espinoza Padgett MD  PGY-3, Obstetrics and Gynecology  Gyn Pager: [phone]

## 2023-10-21 VITALS
DIASTOLIC BLOOD PRESSURE: 73 MMHG | WEIGHT: 99.21 LBS | OXYGEN SATURATION: 96 % | TEMPERATURE: 98.6 F | HEART RATE: 71 BPM | RESPIRATION RATE: 18 BRPM | SYSTOLIC BLOOD PRESSURE: 110 MMHG | BODY MASS INDEX: 19.38 KG/M2

## 2023-10-21 LAB
ANION GAP SERPL CALC-SCNC: 12 MMOL/L (ref 10–20)
BACTERIA UR CULT: NO GROWTH
BUN SERPL-MCNC: 5 MG/DL (ref 6–23)
CALCIUM SERPL-MCNC: 8.4 MG/DL (ref 8.6–10.6)
CHLORIDE SERPL-SCNC: 108 MMOL/L (ref 98–107)
CO2 SERPL-SCNC: 24 MMOL/L (ref 21–32)
CREAT SERPL-MCNC: 0.57 MG/DL (ref 0.5–1.05)
ERYTHROCYTE [DISTWIDTH] IN BLOOD BY AUTOMATED COUNT: 13.4 % (ref 11.5–14.5)
GFR SERPL CREATININE-BSD FRML MDRD: >90 ML/MIN/1.73M*2
GLUCOSE SERPL-MCNC: 80 MG/DL (ref 74–99)
HCT VFR BLD AUTO: 28.9 % (ref 36–46)
HGB BLD-MCNC: 9.5 G/DL (ref 12–16)
MAGNESIUM SERPL-MCNC: 1.92 MG/DL (ref 1.6–2.4)
MCH RBC QN AUTO: 30.8 PG (ref 26–34)
MCHC RBC AUTO-ENTMCNC: 32.9 G/DL (ref 32–36)
MCV RBC AUTO: 94 FL (ref 80–100)
NRBC BLD-RTO: 0 /100 WBCS (ref 0–0)
PLATELET # BLD AUTO: 218 X10*3/UL (ref 150–450)
PMV BLD AUTO: 10.1 FL (ref 7.5–11.5)
POTASSIUM SERPL-SCNC: 3.6 MMOL/L (ref 3.5–5.3)
RBC # BLD AUTO: 3.08 X10*6/UL (ref 4–5.2)
SODIUM SERPL-SCNC: 140 MMOL/L (ref 136–145)
WBC # BLD AUTO: 8 X10*3/UL (ref 4.4–11.3)

## 2023-10-21 PROCEDURE — 80048 BASIC METABOLIC PNL TOTAL CA: CPT

## 2023-10-21 PROCEDURE — 2500000001 HC RX 250 WO HCPCS SELF ADMINISTERED DRUGS (ALT 637 FOR MEDICARE OP)

## 2023-10-21 PROCEDURE — 2500000004 HC RX 250 GENERAL PHARMACY W/ HCPCS (ALT 636 FOR OP/ED)

## 2023-10-21 PROCEDURE — 36415 COLL VENOUS BLD VENIPUNCTURE: CPT | Mod: CMCLAB

## 2023-10-21 PROCEDURE — 85027 COMPLETE CBC AUTOMATED: CPT

## 2023-10-21 PROCEDURE — 99238 HOSP IP/OBS DSCHRG MGMT 30/<: CPT

## 2023-10-21 PROCEDURE — 83735 ASSAY OF MAGNESIUM: CPT

## 2023-10-21 RX ADMIN — ACETAMINOPHEN 975 MG: 325 TABLET ORAL at 08:36

## 2023-10-21 RX ADMIN — ACETAMINOPHEN 975 MG: 325 TABLET ORAL at 03:00

## 2023-10-21 RX ADMIN — FAMOTIDINE 20 MG: 20 TABLET ORAL at 08:36

## 2023-10-21 RX ADMIN — IBUPROFEN 600 MG: 600 TABLET, FILM COATED ORAL at 03:01

## 2023-10-21 RX ADMIN — POLYETHYLENE GLYCOL 3350 17 G: 17 POWDER, FOR SOLUTION ORAL at 08:36

## 2023-10-21 RX ADMIN — IBUPROFEN 600 MG: 600 TABLET, FILM COATED ORAL at 08:36

## 2023-10-21 ASSESSMENT — COGNITIVE AND FUNCTIONAL STATUS - GENERAL
MOBILITY SCORE: 24
DAILY ACTIVITIY SCORE: 24
PATIENT BASELINE BEDBOUND: NO

## 2023-10-21 ASSESSMENT — PAIN SCALES - GENERAL
PAINLEVEL_OUTOF10: 3
PAINLEVEL_OUTOF10: 1
PAINLEVEL_OUTOF10: 2

## 2023-10-21 ASSESSMENT — ACTIVITIES OF DAILY LIVING (ADL)
LACK_OF_TRANSPORTATION: NO
LACK_OF_TRANSPORTATION: NO

## 2023-10-21 NOTE — DISCHARGE SUMMARY
Discharge Summary    Admission Date: 10/18/2023  Discharge Date: 10/21/23    Discharge Diagnosis  Other ectopic pregnancy without intrauterine pregnancy    Hospital Course     Procedures: Cornual wedge resection of cornual ectopic pregnancy, left salpingectomy  Contraception at discharge: oral contraceptives    29yo F presented with cornual ectopic seen on ultrasound, confirmed with repeat ultrasound in house. Surgical removal via wedge resection of left cornu and left salpingectomy completed 10/19. Procedure uncomplicated, see op note for full details. Postop course notable for fever on POD0 night, blood and urine cultures collected, Vanc/Zosyn x1 dose given. CXR negative. Overall low suspicion for infection, no localizing signs or symptoms, most likely fever in setting of recent surgery. Pt meeting all postop milestones and afebrile on POD2. Discharged home with OCPs for contraception and follow up in clinic in 2 weeks. Also added to beta book to further manage bHCG quants, which were appropriately downtrending in the hospital postoperatively.    Pertinent Physical Exam At Time of Discharge  Constitutional: Well developed, awake/alert/oriented x3, no distress, alert and cooperative  Eyes: clear sclera  Head/Neck: Normocephalic  Respiratory/Thorax: Normal respiratory effort on RA, CTAB   Cardiovascular: warm and well perfused   Gastrointestinal: soft, nondistended, mildly tender to palpation, without rebound or guarding, laparoscopic incisions covered in clean dry steristrips, no strikethrough   Musculoskeletal: grossly normal ROM  Extremities: No LE tenderness with palpation  Neurological: CN II - XII grossly intact  Psychological: Appropriate mood and behavior  Skin: warm, dry, no lesions    Discharge Meds     Your medication list        START taking these medications        Instructions Last Dose Given Next Dose Due   acetaminophen 500 mg tablet  Commonly known as: Tylenol      Take 2 tablets (1,000 mg) by mouth  every 6 hours if needed for mild pain (1 - 3).       ibuprofen 600 mg tablet      Take 1 tablet (600 mg) by mouth every 6 hours if needed for mild pain (1 - 3).       norgestimate-ethinyl estradioL 0.25-35 mg-mcg tablet  Commonly known as: Ortho-Cyclen      Take 1 tablet by mouth once daily.       oxyCODONE 5 mg immediate release tablet  Commonly known as: Roxicodone      Take 1 tablet (5 mg) by mouth every 6 hours if needed for severe pain (7 - 10).       polyethylene glycol 17 gram/dose powder  Commonly known as: Glycolax, Miralax      Take 17 g by mouth once daily.                 Where to Get Your Medications        These medications were sent to [facility]/pharmacy #[id number] - [facility], [place] - [address] AT [facility]  [address], [facility] [address]      Phone: [phone]   acetaminophen 500 mg tablet  ibuprofen 600 mg tablet  norgestimate-ethinyl estradioL 0.25-35 mg-mcg tablet  oxyCODONE 5 mg immediate release tablet  polyethylene glycol 17 gram/dose powder          Complications Requiring Follow-Up  none    Test Results Pending At Discharge  Pending Labs       Order Current Status    Extra Urine Gray Tube Collected (10/18/23 2016)    Surgical Pathology Exam In process    Urinalysis with Reflex Microscopic and Culture In process    Blood Culture Preliminary result    Blood Culture Preliminary result            Outpatient Follow-Up  Future Appointments   Date Time Provider Department Center   11/3/2023  2:15 PM Paulette Damon MD [id number]         D/w Dr. Fiorella Lee MD PGY2

## 2023-10-21 NOTE — CARE PLAN
Problem: Fall/Injury  Goal: Not fall by end of shift  Outcome: Progressing  Goal: Be free from injury by end of the shift  Outcome: Progressing  Goal: Verbalize understanding of personal risk factors for fall in the hospital  Outcome: Progressing  Goal: Verbalize understanding of risk factor reduction measures to prevent injury from fall in the home  Outcome: Progressing  Goal: Use assistive devices by end of the shift  Outcome: Progressing  Goal: Pace activities to prevent fatigue by end of the shift  Outcome: Progressing     Problem: Skin  Goal: Decreased wound size/increased tissue granulation at next dressing change  Outcome: Progressing  Goal: Participates in plan/prevention/treatment measures  Outcome: Progressing  Goal: Prevent/manage excess moisture  Outcome: Progressing  Goal: Prevent/minimize sheer/friction injuries  Outcome: Progressing  Goal: Promote/optimize nutrition  Outcome: Progressing  Goal: Promote skin healing  Outcome: Progressing     Problem: Pain  Goal: Takes deep breaths with improved pain control throughout the shift  Outcome: Progressing  Goal: Turns in bed with improved pain control throughout the shift  Outcome: Progressing  Goal: Walks with improved pain control throughout the shift  Outcome: Progressing  Goal: Performs ADL's with improved pain control throughout shift  Outcome: Progressing  Goal: Participates in PT with improved pain control throughout the shift  Outcome: Progressing  Goal: Free from opioid side effects throughout the shift  Outcome: Progressing  Goal: Free from acute confusion related to pain meds throughout the shift  Outcome: Progressing   The patient's goals for the shift include use the breast pump for my 10 mo. old.    The clinical goals for the shift include pain will be a 4 or below and she will verbalize her feelings r/t loss of pregnancy.  VSS and pt.'s pain is under control with tylenol,motrin and oxycodone.She is sad but doing ok.She git a CXR today and  pepcid helped her epig. Pain.She used breast pump several times and her infant visited her today.She is more steady on her feet now.Her H/H is stable today and her QBHCG is decreasing.Stable.

## 2023-10-21 NOTE — CARE PLAN
The patient's goals for the shift include use the breast pump for my 10 mo. old.    The clinical goals for the shift include pain control      Problem: Fall/Injury  Goal: Not fall by end of shift  Outcome: Met  Goal: Be free from injury by end of the shift  Outcome: Met  Goal: Verbalize understanding of personal risk factors for fall in the hospital  Outcome: Met  Goal: Verbalize understanding of risk factor reduction measures to prevent injury from fall in the home  Outcome: Met  Goal: Use assistive devices by end of the shift  Outcome: Met  Goal: Pace activities to prevent fatigue by end of the shift  Outcome: Met     Problem: Skin  Goal: Decreased wound size/increased tissue granulation at next dressing change  Outcome: Met  Goal: Participates in plan/prevention/treatment measures  Outcome: Met  Goal: Prevent/manage excess moisture  Outcome: Met  Goal: Prevent/minimize sheer/friction injuries  Outcome: Met  Goal: Promote/optimize nutrition  Outcome: Met  Goal: Promote skin healing  Outcome: Met     Problem: Pain  Goal: Takes deep breaths with improved pain control throughout the shift  Outcome: Met  Goal: Turns in bed with improved pain control throughout the shift  Outcome: Met  Goal: Walks with improved pain control throughout the shift  Outcome: Met  Goal: Performs ADL's with improved pain control throughout shift  Outcome: Met  Goal: Participates in PT with improved pain control throughout the shift  Outcome: Met  Goal: Free from opioid side effects throughout the shift  Outcome: Met  Goal: Free from acute confusion related to pain meds throughout the shift  Outcome: Met

## 2023-10-22 ENCOUNTER — DOCUMENTATION (OUTPATIENT)
Dept: OBSTETRICS AND GYNECOLOGY | Facility: HOSPITAL | Age: 28
End: 2023-10-22
Payer: MEDICAID

## 2023-10-23 NOTE — SIGNIFICANT EVENT
Follow-up Phone Call Note:   Interview:  Care Type: Women's Health   Phone Number Call  [phone]   Call Outcome: connected with patient   Patient Reports Feeling (symptoms) Are: better   Which Meds Were New Meds: yes   Which Meds to Continue: yes   Which Meds to Stop: no medications were discontinued   Who participated in medication reconciliation with the hospital staff?: you   In your professional opinion do you think there was a medication discrepancy or potential for medication discrepancy in this situation?: no   Medication Issues: no medication issues   Discharge Instructions Clear: yes   Patient Has a Primary Care Provider: yes   Post-hospitalization Follow-up Occurred According To Schedule: no   Reason: appointment scheduled in future    Patient Has Plan Specific Name And Number Of Who To Call For Concerns: yes   Stroke Patient: no  Comments:    Date/Time Of Call: 10/23/23 at 173   Call Back Done By: care coordinator   Callback Complete: yes

## 2023-10-24 LAB
BACTERIA BLD CULT: NORMAL
BACTERIA BLD CULT: NORMAL

## 2023-10-25 ENCOUNTER — LAB (OUTPATIENT)
Dept: LAB | Facility: LAB | Age: 28
End: 2023-10-25
Payer: MEDICAID

## 2023-10-25 DIAGNOSIS — O00.80 OTHER ECTOPIC PREGNANCY WITHOUT INTRAUTERINE PREGNANCY (HHS-HCC): ICD-10-CM

## 2023-10-25 LAB — B-HCG SERPL-ACNC: 278 MIU/ML

## 2023-10-25 PROCEDURE — 84702 CHORIONIC GONADOTROPIN TEST: CPT

## 2023-10-25 PROCEDURE — 36415 COLL VENOUS BLD VENIPUNCTURE: CPT

## 2023-10-26 ENCOUNTER — HOSPITAL ENCOUNTER (OUTPATIENT)
Dept: CARDIOLOGY | Facility: HOSPITAL | Age: 28
Discharge: HOME | End: 2023-10-26
Payer: MEDICAID

## 2023-10-26 DIAGNOSIS — O00.80 PREGNANCY, ECTOPIC, CORNUAL OR CERVICAL (HHS-HCC): Primary | ICD-10-CM

## 2023-10-26 LAB
ATRIAL RATE: 102 BPM
P AXIS: 68 DEGREES
PR INTERVAL: 176 MS
Q ONSET: 223 MS
QRS COUNT: 17 BEATS
QRS DURATION: 68 MS
QT INTERVAL: 328 MS
QTC CALCULATION(BAZETT): 427 MS
QTC FREDERICIA: 391 MS
R AXIS: 76 DEGREES
T AXIS: 45 DEGREES
T OFFSET: 387 MS
VENTRICULAR RATE: 102 BPM

## 2023-10-26 PROCEDURE — 93005 ELECTROCARDIOGRAM TRACING: CPT

## 2023-10-28 LAB
LABORATORY COMMENT REPORT: NORMAL
PATH REPORT.FINAL DX SPEC: NORMAL
PATH REPORT.GROSS SPEC: NORMAL
PATH REPORT.RELEVANT HX SPEC: NORMAL
PATH REPORT.TOTAL CANCER: NORMAL

## 2023-11-01 ENCOUNTER — LAB (OUTPATIENT)
Dept: LAB | Facility: LAB | Age: 28
End: 2023-11-01

## 2023-11-01 DIAGNOSIS — O00.80 PREGNANCY, ECTOPIC, CORNUAL OR CERVICAL (HHS-HCC): ICD-10-CM

## 2023-11-01 LAB — B-HCG SERPL-ACNC: 24 MIU/ML

## 2023-11-01 PROCEDURE — 84702 CHORIONIC GONADOTROPIN TEST: CPT

## 2023-11-01 PROCEDURE — 36415 COLL VENOUS BLD VENIPUNCTURE: CPT

## 2023-11-02 PROBLEM — E55.9 VITAMIN D DEFICIENCY: Status: ACTIVE | Noted: 2023-11-02

## 2023-11-02 PROBLEM — O99.341 DEPRESSION DURING PREGNANCY IN FIRST TRIMESTER (HHS-HCC): Status: ACTIVE | Noted: 2021-12-29

## 2023-11-02 PROBLEM — O26.10 POOR WEIGHT GAIN OF PREGNANCY (HHS-HCC): Status: ACTIVE | Noted: 2023-11-02

## 2023-11-02 PROBLEM — O21.0 HYPEREMESIS OF PREGNANCY (HHS-HCC): Status: ACTIVE | Noted: 2023-11-02

## 2023-11-02 PROBLEM — R00.2 HEART PALPITATIONS: Status: ACTIVE | Noted: 2023-11-02

## 2023-11-02 PROBLEM — O99.019 ANTEPARTUM ANEMIA (HHS-HCC): Status: ACTIVE | Noted: 2023-11-02

## 2023-11-02 PROBLEM — F32.A DEPRESSION DURING PREGNANCY IN FIRST TRIMESTER (HHS-HCC): Status: ACTIVE | Noted: 2021-12-29

## 2023-11-02 PROBLEM — R63.4 WEIGHT LOSS: Status: ACTIVE | Noted: 2022-01-12

## 2023-11-03 ENCOUNTER — OFFICE VISIT (OUTPATIENT)
Dept: OBSTETRICS AND GYNECOLOGY | Facility: CLINIC | Age: 28
End: 2023-11-03

## 2023-11-03 VITALS — DIASTOLIC BLOOD PRESSURE: 96 MMHG | BODY MASS INDEX: 18.79 KG/M2 | WEIGHT: 96.2 LBS | SYSTOLIC BLOOD PRESSURE: 117 MMHG

## 2023-11-03 DIAGNOSIS — O00.80 PREGNANCY, ECTOPIC, CORNUAL OR CERVICAL (HHS-HCC): ICD-10-CM

## 2023-11-03 DIAGNOSIS — Z09 POSTOPERATIVE EXAMINATION: Primary | ICD-10-CM

## 2023-11-03 PROCEDURE — 99213 OFFICE O/P EST LOW 20 MIN: CPT | Performed by: OBSTETRICS & GYNECOLOGY

## 2023-11-03 PROCEDURE — 1036F TOBACCO NON-USER: CPT | Performed by: OBSTETRICS & GYNECOLOGY

## 2023-11-16 PROBLEM — O00.80 OTHER ECTOPIC PREGNANCY WITHOUT INTRAUTERINE PREGNANCY (HHS-HCC): Status: RESOLVED | Noted: 2023-10-18 | Resolved: 2023-11-16

## 2023-12-06 NOTE — PROGRESS NOTES
Dori Marroquin presents for postop visit after laparoscopic uterine wedge resection with left salpingectomy for left cornual ectopic pregnancy    - reviewed details of surgery, pathology.  Reviewed why left fallopian tube removed and minimal to no effect on ability to conceive in the future as right tube appeared normal.    - postop precautions/expectations reviewed  - discussed need for  for subsequent pregnancies due to history of uterine surgery, potential risks of labor, need for delivery at ~37 wks to avoid labor  - discussed importance of avoiding pregnancy for at least 6 months to allow for optimal healing.  Plans on using COCs at this time for contraception/pregnancy planning/prevention  - will follow hCG trend        ---------------------------------------------  HPI        here for postop visit after uterine wedge resection on 10/19/23  doing well postop  pain controlled  alex PO intake  no urinary/bowel complaints (+BMs/flatus)          Vitals:    23 1400   BP: (!) 117/96       Gen:  well-appearing, NAD  Chest:  breathing easily  Abd:  soft, non-tender throughout, no rebound/guarding/rigidity  Incision(s):  clean, dry, intact.    Ext:  non-tender calves bilaterally, no swelling  Psych:  AAOx3, normal affect.

## 2024-06-18 ENCOUNTER — LAB (OUTPATIENT)
Dept: LAB | Facility: LAB | Age: 29
End: 2024-06-18
Payer: MEDICAID

## 2024-06-18 ENCOUNTER — APPOINTMENT (OUTPATIENT)
Dept: PRIMARY CARE | Facility: CLINIC | Age: 29
End: 2024-06-18
Payer: MEDICAID

## 2024-06-18 VITALS
RESPIRATION RATE: 16 BRPM | SYSTOLIC BLOOD PRESSURE: 113 MMHG | HEART RATE: 69 BPM | OXYGEN SATURATION: 97 % | HEIGHT: 60 IN | DIASTOLIC BLOOD PRESSURE: 75 MMHG | WEIGHT: 91.2 LBS | BODY MASS INDEX: 17.91 KG/M2 | TEMPERATURE: 98 F

## 2024-06-18 DIAGNOSIS — M25.571 ACUTE RIGHT ANKLE PAIN: ICD-10-CM

## 2024-06-18 DIAGNOSIS — D64.9 ANEMIA, UNSPECIFIED TYPE: ICD-10-CM

## 2024-06-18 DIAGNOSIS — Z00.00 HEALTH CARE MAINTENANCE: Primary | ICD-10-CM

## 2024-06-18 DIAGNOSIS — Z00.00 HEALTH CARE MAINTENANCE: ICD-10-CM

## 2024-06-18 LAB
25(OH)D3 SERPL-MCNC: 24 NG/ML (ref 30–100)
ALBUMIN SERPL BCP-MCNC: 4.2 G/DL (ref 3.4–5)
ALP SERPL-CCNC: 69 U/L (ref 33–110)
ALT SERPL W P-5'-P-CCNC: 7 U/L (ref 7–45)
ANION GAP SERPL CALC-SCNC: 11 MMOL/L (ref 10–20)
AST SERPL W P-5'-P-CCNC: 11 U/L (ref 9–39)
B-HCG SERPL-ACNC: <3 MIU/ML
BILIRUB SERPL-MCNC: 0.6 MG/DL (ref 0–1.2)
BUN SERPL-MCNC: 16 MG/DL (ref 6–23)
CALCIUM SERPL-MCNC: 9.4 MG/DL (ref 8.6–10.6)
CHLORIDE SERPL-SCNC: 107 MMOL/L (ref 98–107)
CHOLEST SERPL-MCNC: 147 MG/DL (ref 0–199)
CHOLESTEROL/HDL RATIO: 2.9
CO2 SERPL-SCNC: 27 MMOL/L (ref 21–32)
CREAT SERPL-MCNC: 0.81 MG/DL (ref 0.5–1.05)
EGFRCR SERPLBLD CKD-EPI 2021: >90 ML/MIN/1.73M*2
ERYTHROCYTE [DISTWIDTH] IN BLOOD BY AUTOMATED COUNT: 13.2 % (ref 11.5–14.5)
FERRITIN SERPL-MCNC: 58 NG/ML (ref 8–150)
GLUCOSE SERPL-MCNC: 80 MG/DL (ref 74–99)
HCT VFR BLD AUTO: 36.8 % (ref 36–46)
HDLC SERPL-MCNC: 50.7 MG/DL
HGB BLD-MCNC: 11.9 G/DL (ref 12–16)
IRON SATN MFR SERPL: 40 % (ref 25–45)
IRON SERPL-MCNC: 128 UG/DL (ref 35–150)
LDLC SERPL CALC-MCNC: 88 MG/DL
MCH RBC QN AUTO: 30.5 PG (ref 26–34)
MCHC RBC AUTO-ENTMCNC: 32.3 G/DL (ref 32–36)
MCV RBC AUTO: 94 FL (ref 80–100)
NON HDL CHOLESTEROL: 96 MG/DL (ref 0–149)
NRBC BLD-RTO: 0 /100 WBCS (ref 0–0)
PLATELET # BLD AUTO: 314 X10*3/UL (ref 150–450)
POTASSIUM SERPL-SCNC: 4.4 MMOL/L (ref 3.5–5.3)
PROT SERPL-MCNC: 6.9 G/DL (ref 6.4–8.2)
RBC # BLD AUTO: 3.9 X10*6/UL (ref 4–5.2)
SODIUM SERPL-SCNC: 141 MMOL/L (ref 136–145)
TIBC SERPL-MCNC: 321 UG/DL (ref 240–445)
TRIGL SERPL-MCNC: 43 MG/DL (ref 0–149)
TSH SERPL-ACNC: 1.58 MIU/L (ref 0.44–3.98)
UIBC SERPL-MCNC: 193 UG/DL (ref 110–370)
VLDL: 9 MG/DL (ref 0–40)
WBC # BLD AUTO: 6.7 X10*3/UL (ref 4.4–11.3)

## 2024-06-18 PROCEDURE — 84443 ASSAY THYROID STIM HORMONE: CPT

## 2024-06-18 PROCEDURE — 83540 ASSAY OF IRON: CPT

## 2024-06-18 PROCEDURE — 80053 COMPREHEN METABOLIC PANEL: CPT

## 2024-06-18 PROCEDURE — 80061 LIPID PANEL: CPT

## 2024-06-18 PROCEDURE — 36415 COLL VENOUS BLD VENIPUNCTURE: CPT

## 2024-06-18 PROCEDURE — 84702 CHORIONIC GONADOTROPIN TEST: CPT

## 2024-06-18 PROCEDURE — 85027 COMPLETE CBC AUTOMATED: CPT

## 2024-06-18 PROCEDURE — 83550 IRON BINDING TEST: CPT

## 2024-06-18 PROCEDURE — 82306 VITAMIN D 25 HYDROXY: CPT

## 2024-06-18 PROCEDURE — 99385 PREV VISIT NEW AGE 18-39: CPT | Performed by: STUDENT IN AN ORGANIZED HEALTH CARE EDUCATION/TRAINING PROGRAM

## 2024-06-18 PROCEDURE — 82728 ASSAY OF FERRITIN: CPT

## 2024-06-18 ASSESSMENT — ENCOUNTER SYMPTOMS
SHORTNESS OF BREATH: 0
HEMATURIA: 0
DEPRESSION: 0
VOMITING: 0
SPEECH DIFFICULTY: 0
DYSURIA: 0
ACTIVITY CHANGE: 0
WEAKNESS: 0
ABDOMINAL PAIN: 0
WHEEZING: 0
NUMBNESS: 0
NAUSEA: 0
ARTHRALGIAS: 1
CONSTIPATION: 0
DIZZINESS: 0
COUGH: 0
FEVER: 0

## 2024-06-18 NOTE — PROGRESS NOTES
Subjective   Patient ID: Dori Marroquin is a 28 y.o. female who presents for Annual Exam.  HPI  Droi Marroquin is 28 y.o. is here to establish care    Chronic active medical problems  Anemia during pregnancy       Right ankle X run over by [facility] 3 weeks ago; still has some tenderness on the lateral side     Past surgeries  October 2023; c setion   Allergies pine apple    T X  Alc X  Marijaun X    FH   DM: X  HTN: dad  Cacners  GGM: breast cacner   Heart X         MH:   More nausea. Pain since last sugery   LMP: 5/15      Cancer screening  1Pap smear 2022 neg for malig        Immunizations  Tdap UTD     Past Medical History:   Diagnosis Date    Anemia complicating pregnancy, first trimester ([facility]) 09/28/2022    Anemia during pregnancy in first trimester    Anemia complicating pregnancy, third trimester ([facility]) 10/07/2022    Maternal anemia in pregnancy, antepartum, third trimester    Diseases of the digestive system complicating pregnancy, second trimester ([facility]) 09/14/2022    Constipation in pregnancy in second trimester    Encounter for immunization     Encounter for immunization    Encounter for pregnancy test, result positive ([facility]) 05/05/2022    Positive urine pregnancy test    Encounter for supervision of normal first pregnancy, first trimester ([facility]) 06/11/2022    Encounter for prenatal care in first trimester of first pregnancy    Encounter for supervision of other normal pregnancy, unspecified trimester ([facility]) 10/20/2022    Prenatal care, subsequent pregnancy    Low weight gain in pregnancy, unspecified trimester ([facility]) 09/28/2022    Poor weight gain of pregnancy    Other conditions influencing health status     History of pregnancy    Personal history of other complications of pregnancy, childbirth and the puerperium 08/18/2022    History of hyperemesis gravidarum    Personal history of other complications of pregnancy, childbirth and the puerperium 10/20/2022    History of prior  pregnancy with intrauterine growth restricted     Personal history of other diseases of the musculoskeletal system and connective tissue 2022    History of scoliosis    Personal history of other specified conditions 2022    History of weight loss      No past surgical history on file.   No family history on file.   Allergies   Allergen Reactions    Pineapple Hives, Other and Itching          Occupation:     Review of Systems   Constitutional:  Negative for activity change and fever.   HENT:  Negative for congestion.    Respiratory:  Negative for cough, shortness of breath and wheezing.    Cardiovascular:  Negative for chest pain and leg swelling.   Gastrointestinal:  Negative for abdominal pain, constipation, nausea and vomiting.   Endocrine: Negative for cold intolerance.   Genitourinary:  Negative for dysuria, hematuria and urgency.   Musculoskeletal:  Positive for arthralgias.   Neurological:  Negative for dizziness, speech difficulty, weakness and numbness.   Psychiatric/Behavioral:  Negative for self-injury and suicidal ideas.        Objective   Visit Vitals  /75   Pulse 69   Temp 36.7 °C (98 °F)   Resp 16   Ht 1.524 m (5')   Wt (!) 41.4 kg (91 lb 3.2 oz)   SpO2 97%   BMI 17.81 kg/m²   OB Status Pregnant   Smoking Status Never   BSA 1.32 m²      Physical Exam  HENT:      Head: Normocephalic and atraumatic.      Right Ear: Tympanic membrane normal.      Left Ear: Tympanic membrane normal.      Nose: Nose normal.      Mouth/Throat:      Mouth: Mucous membranes are moist.   Eyes:      Extraocular Movements: Extraocular movements intact.      Conjunctiva/sclera: Conjunctivae normal.      Pupils: Pupils are equal, round, and reactive to light.   Cardiovascular:      Rate and Rhythm: Normal rate and regular rhythm.      Pulses: Normal pulses.      Heart sounds: Normal heart sounds.   Pulmonary:      Effort: Pulmonary effort is normal.      Breath sounds: Normal breath sounds. No stridor. No  rhonchi.   Abdominal:      General: Bowel sounds are normal.      Palpations: Abdomen is soft.      Tenderness: There is no abdominal tenderness. There is no guarding or rebound.   Musculoskeletal:      Cervical back: Neck supple.   Neurological:      Mental Status: She is oriented to person, place, and time.   Psychiatric:         Mood and Affect: Mood normal.         Behavior: Behavior normal.     Right ankle- tenderness at posterior edge of lateral malleoli    Assessment/Plan   Diagnoses and all orders for this visit:  Health care maintenance  -     Ferritin; Future  -     Iron and TIBC; Future  -     Lipid Panel; Future  -     Comprehensive Metabolic Panel; Future  -     CBC; Future  -     TSH with reflex to Free T4 if abnormal; Future  -     Vitamin D 25 hydroxy; Future  Acute right ankle pain  Xray after hcg( LMP:5/15); sexually active  -     HCG, quantitative, pregnancy; Future  Anemia, unspecified type  -     Ferritin; Future  -     Iron and TIBC; Future

## 2024-07-09 ENCOUNTER — LAB (OUTPATIENT)
Dept: LAB | Facility: LAB | Age: 29
End: 2024-07-09
Payer: MEDICAID

## 2024-07-09 ENCOUNTER — OFFICE VISIT (OUTPATIENT)
Dept: OBSTETRICS AND GYNECOLOGY | Facility: HOSPITAL | Age: 29
End: 2024-07-09
Payer: MEDICAID

## 2024-07-09 VITALS
WEIGHT: 90 LBS | BODY MASS INDEX: 17.67 KG/M2 | DIASTOLIC BLOOD PRESSURE: 78 MMHG | SYSTOLIC BLOOD PRESSURE: 119 MMHG | HEIGHT: 60 IN

## 2024-07-09 DIAGNOSIS — Z11.3 ROUTINE SCREENING FOR STI (SEXUALLY TRANSMITTED INFECTION): ICD-10-CM

## 2024-07-09 DIAGNOSIS — Z01.419 WELL WOMAN EXAM: ICD-10-CM

## 2024-07-09 DIAGNOSIS — Z01.419 WELL WOMAN EXAM: Primary | ICD-10-CM

## 2024-07-09 LAB
HBV SURFACE AG SERPL QL IA: NONREACTIVE
HCV AB SER QL: NONREACTIVE
HIV 1+2 AB+HIV1 P24 AG SERPL QL IA: NONREACTIVE
TREPONEMA PALLIDUM IGG+IGM AB [PRESENCE] IN SERUM OR PLASMA BY IMMUNOASSAY: NONREACTIVE

## 2024-07-09 PROCEDURE — 87491 CHLMYD TRACH DNA AMP PROBE: CPT | Performed by: STUDENT IN AN ORGANIZED HEALTH CARE EDUCATION/TRAINING PROGRAM

## 2024-07-09 PROCEDURE — 87389 HIV-1 AG W/HIV-1&-2 AB AG IA: CPT

## 2024-07-09 PROCEDURE — 87340 HEPATITIS B SURFACE AG IA: CPT

## 2024-07-09 PROCEDURE — 86803 HEPATITIS C AB TEST: CPT

## 2024-07-09 PROCEDURE — 86780 TREPONEMA PALLIDUM: CPT

## 2024-07-09 PROCEDURE — 36415 COLL VENOUS BLD VENIPUNCTURE: CPT

## 2024-07-09 PROCEDURE — 87661 TRICHOMONAS VAGINALIS AMPLIF: CPT | Performed by: STUDENT IN AN ORGANIZED HEALTH CARE EDUCATION/TRAINING PROGRAM

## 2024-07-09 PROCEDURE — 99395 PREV VISIT EST AGE 18-39: CPT | Performed by: STUDENT IN AN ORGANIZED HEALTH CARE EDUCATION/TRAINING PROGRAM

## 2024-07-09 SDOH — ECONOMIC STABILITY: FOOD INSECURITY: WITHIN THE PAST 12 MONTHS, THE FOOD YOU BOUGHT JUST DIDN'T LAST AND YOU DIDN'T HAVE MONEY TO GET MORE.: NEVER TRUE

## 2024-07-09 SDOH — ECONOMIC STABILITY: FOOD INSECURITY: WITHIN THE PAST 12 MONTHS, YOU WORRIED THAT YOUR FOOD WOULD RUN OUT BEFORE YOU GOT MONEY TO BUY MORE.: NEVER TRUE

## 2024-07-09 ASSESSMENT — ENCOUNTER SYMPTOMS
DEPRESSION: 0
OCCASIONAL FEELINGS OF UNSTEADINESS: 0
LOSS OF SENSATION IN FEET: 0

## 2024-07-09 ASSESSMENT — PATIENT HEALTH QUESTIONNAIRE - PHQ9
1. LITTLE INTEREST OR PLEASURE IN DOING THINGS: NOT AT ALL
2. FEELING DOWN, DEPRESSED OR HOPELESS: NOT AT ALL
SUM OF ALL RESPONSES TO PHQ9 QUESTIONS 1 & 2: 0

## 2024-07-09 NOTE — PROGRESS NOTES
Subjective   Dori Marroquin is a 28 y.o. y.o. here for a routine exam. Current concerns: had an episode of unscheduled bleeding. Has never happened before and followed her daughter (19 mo) jumping on her abdomen. Also had an episode of burning w urination but UA at ED on  unremarkable.     Gynecologic History  LMP , bleeding episode   Contraception: condoms  Last Pap: 2022. Results were: normal  HPV vaccination: yes    Obstetric History  OB History    Para Term  AB Living   3 1 1   1 1   SAB IAB Ectopic Multiple Live Births       1   1      # Outcome Date GA Lbr Wilber/2nd Weight Sex Delivery Anes PTL Lv   3 Current            2 Ectopic            1 Term 22    F CS-LTranv EPI N DONITA       Objective   Physical Exam  Constitutional:       Appearance: Normal appearance.   HENT:      Head: Normocephalic and atraumatic.      Nose: Nose normal.      Mouth/Throat:      Mouth: Mucous membranes are moist.      Pharynx: No oropharyngeal exudate or posterior oropharyngeal erythema.   Eyes:      Extraocular Movements: Extraocular movements intact.      Conjunctiva/sclera: Conjunctivae normal.   Pulmonary:      Effort: Pulmonary effort is normal.   Musculoskeletal:      Cervical back: Normal range of motion.   Skin:     Findings: No bruising, erythema, lesion or rash.   Neurological:      General: No focal deficit present.      Mental Status: She is alert.   Psychiatric:         Mood and Affect: Mood normal.         Behavior: Behavior normal.         Thought Content: Thought content normal.         Judgment: Judgment normal.         Assessment/Plan   Healthy gynecologic exam.    STI panel collected.  Reviewed pap due next year.    Follow up for well care or as needed.    Gertrude Delgado MD

## 2024-07-10 LAB
C TRACH RRNA SPEC QL NAA+PROBE: NEGATIVE
N GONORRHOEA DNA SPEC QL PROBE+SIG AMP: NEGATIVE
T VAGINALIS RRNA SPEC QL NAA+PROBE: NEGATIVE

## 2025-02-04 ENCOUNTER — INITIAL PRENATAL (OUTPATIENT)
Dept: OBSTETRICS AND GYNECOLOGY | Facility: HOSPITAL | Age: 30
End: 2025-02-04
Payer: MEDICAID

## 2025-02-04 VITALS — DIASTOLIC BLOOD PRESSURE: 72 MMHG | SYSTOLIC BLOOD PRESSURE: 122 MMHG | WEIGHT: 93 LBS | BODY MASS INDEX: 18.16 KG/M2

## 2025-02-04 DIAGNOSIS — Z3A.10 10 WEEKS GESTATION OF PREGNANCY (HHS-HCC): Primary | ICD-10-CM

## 2025-02-04 DIAGNOSIS — O99.341 DEPRESSION DURING PREGNANCY IN FIRST TRIMESTER (HHS-HCC): ICD-10-CM

## 2025-02-04 DIAGNOSIS — O21.9 NAUSEA AND VOMITING IN PREGNANCY PRIOR TO 22 WEEKS GESTATION: ICD-10-CM

## 2025-02-04 DIAGNOSIS — O34.219 UTERINE SCAR FROM PREVIOUS CESAREAN DELIVERY AFFECTING PREGNANCY (HHS-HCC): ICD-10-CM

## 2025-02-04 DIAGNOSIS — Z90.79 HISTORY OF UNILATERAL SALPINGECTOMY: ICD-10-CM

## 2025-02-04 DIAGNOSIS — F32.A DEPRESSION DURING PREGNANCY IN FIRST TRIMESTER (HHS-HCC): ICD-10-CM

## 2025-02-04 DIAGNOSIS — Z87.59 PREVIOUS BABY WITH FETAL GROWTH RESTRICTION: ICD-10-CM

## 2025-02-04 PROBLEM — R63.4 WEIGHT LOSS: Status: RESOLVED | Noted: 2022-01-12 | Resolved: 2025-02-04

## 2025-02-04 PROBLEM — O26.10 POOR WEIGHT GAIN OF PREGNANCY (HHS-HCC): Status: RESOLVED | Noted: 2023-11-02 | Resolved: 2025-02-04

## 2025-02-04 PROBLEM — O00.80: Status: RESOLVED | Noted: 2023-10-18 | Resolved: 2025-02-04

## 2025-02-04 PROBLEM — O21.0 HYPEREMESIS OF PREGNANCY (HHS-HCC): Status: RESOLVED | Noted: 2023-11-02 | Resolved: 2025-02-04

## 2025-02-04 PROBLEM — O99.019 ANTEPARTUM ANEMIA (HHS-HCC): Status: RESOLVED | Noted: 2023-11-02 | Resolved: 2025-02-04

## 2025-02-04 PROBLEM — E55.9 VITAMIN D DEFICIENCY: Status: RESOLVED | Noted: 2023-11-02 | Resolved: 2025-02-04

## 2025-02-04 PROBLEM — R00.2 HEART PALPITATIONS: Status: RESOLVED | Noted: 2023-11-02 | Resolved: 2025-02-04

## 2025-02-04 PROCEDURE — 99214 OFFICE O/P EST MOD 30 MIN: CPT

## 2025-02-04 RX ORDER — ONDANSETRON 4 MG/1
4 TABLET, ORALLY DISINTEGRATING ORAL EVERY 8 HOURS PRN
Qty: 120 TABLET | Refills: 0 | Status: SHIPPED | OUTPATIENT
Start: 2025-02-04 | End: 2025-04-05

## 2025-02-04 RX ORDER — PRENATAL NO.144/FOLIC ACID 400 MCG
1 TABLET,CHEWABLE ORAL DAILY
Qty: 90 TABLET | Refills: 3 | Status: SHIPPED | OUTPATIENT
Start: 2025-02-04 | End: 2026-02-04

## 2025-02-04 RX ORDER — BISMUTH SUBSALICYLATE 262 MG
1 TABLET,CHEWABLE ORAL DAILY
COMMUNITY

## 2025-02-04 SDOH — ECONOMIC STABILITY: FOOD INSECURITY: WITHIN THE PAST 12 MONTHS, THE FOOD YOU BOUGHT JUST DIDN'T LAST AND YOU DIDN'T HAVE MONEY TO GET MORE.: NEVER TRUE

## 2025-02-04 SDOH — ECONOMIC STABILITY: FOOD INSECURITY: WITHIN THE PAST 12 MONTHS, YOU WORRIED THAT YOUR FOOD WOULD RUN OUT BEFORE YOU GOT MONEY TO BUY MORE.: NEVER TRUE

## 2025-02-04 ASSESSMENT — EDINBURGH POSTNATAL DEPRESSION SCALE (EPDS)
I HAVE BEEN ABLE TO LAUGH AND SEE THE FUNNY SIDE OF THINGS: AS MUCH AS I ALWAYS COULD
I HAVE FELT SAD OR MISERABLE: NOT VERY OFTEN
I HAVE FELT SCARED OR PANICKY FOR NO GOOD REASON: NO, NOT AT ALL
I HAVE BEEN ANXIOUS OR WORRIED FOR NO GOOD REASON: HARDLY EVER
I HAVE BLAMED MYSELF UNNECESSARILY WHEN THINGS WENT WRONG: NOT VERY OFTEN
TOTAL SCORE: 5
I HAVE BEEN SO UNHAPPY THAT I HAVE HAD DIFFICULTY SLEEPING: NOT AT ALL
I HAVE BEEN SO UNHAPPY THAT I HAVE BEEN CRYING: ONLY OCCASIONALLY
THINGS HAVE BEEN GETTING ON TOP OF ME: NO, MOST OF THE TIME I HAVE COPED QUITE WELL
THE THOUGHT OF HARMING MYSELF HAS OCCURRED TO ME: NEVER
I HAVE LOOKED FORWARD WITH ENJOYMENT TO THINGS: AS MUCH AS I EVER DID

## 2025-02-04 ASSESSMENT — ENCOUNTER SYMPTOMS
LOSS OF SENSATION IN FEET: 0
OCCASIONAL FEELINGS OF UNSTEADINESS: 0
DEPRESSION: 0

## 2025-02-04 NOTE — PROGRESS NOTES
Subjective   Dori Marroquin is a 29 y.o.  at 10w1d with a working estimated date of delivery of 2025, by Last Menstrual Period who presents for an initial prenatal visit. This pregnancy is unplanned and accepted.    Patient currently experiencing: nausea/vomitting, desires anti-emetics, fatigue  Bleeding or cramping since LMP: no  Taking prenatal vitamin: No; ordered today  Other concerns today:  Ultrasound completed this pregnancy: Yes - 7 week ultrasound at [facility] when she presented to the ED for N/V   Last pap: ; due today, patient declined today    OB History    Para Term  AB Living   4 1 1   2 1   SAB IAB Ectopic Multiple Live Births     1 1   1      # Outcome Date GA Lbr Wilber/2nd Weight Sex Type Anes PTL Lv   4 Current            3 Ectopic 10/22/23           2 Term 22    F CS-LTranv EPI N DONITA   1 IAB 2022 12w0d            Prior pregnancy complications: anemia, fetal growth restriction, ectopic  History of hypertension:  No    Past Medical History:   Diagnosis Date    Anemia complicating pregnancy, first trimester ([facility]) 2022    Anemia during pregnancy in first trimester    Anemia complicating pregnancy, third trimester ([facility]) 10/07/2022    Maternal anemia in pregnancy, antepartum, third trimester    Diseases of the digestive system complicating pregnancy, second trimester ([facility]) 2022    Constipation in pregnancy in second trimester    Encounter for immunization     Encounter for immunization    Encounter for pregnancy test, result positive ([facility]) 2022    Positive urine pregnancy test    Encounter for supervision of normal first pregnancy, first trimester 2022    Encounter for prenatal care in first trimester of first pregnancy    Encounter for supervision of other normal pregnancy, unspecified trimester ([facility]) 10/20/2022    Prenatal care, subsequent pregnancy    Low weight gain in pregnancy, unspecified trimester ([facility]) 2022     Poor weight gain of pregnancy    Other conditions influencing health status     History of pregnancy    Personal history of other complications of pregnancy, childbirth and the puerperium 2022    History of hyperemesis gravidarum    Personal history of other complications of pregnancy, childbirth and the puerperium 10/20/2022    History of prior pregnancy with intrauterine growth restricted     Personal history of other diseases of the musculoskeletal system and connective tissue 2022    History of scoliosis    Personal history of other specified conditions 2022    History of weight loss      Past Surgical History:   Procedure Laterality Date     SECTION, LOW TRANSVERSE  2022    ECTOPIC PREGNANCY SURGERY        Social History     Socioeconomic History    Marital status: Single   Tobacco Use    Smoking status: Never    Smokeless tobacco: Never   Vaping Use    Vaping status: Never Used   Substance and Sexual Activity    Alcohol use: Never    Drug use: Never    Sexual activity: Yes     Partners: Male     Birth control/protection: None     Social Drivers of Health     Financial Resource Strain: Not on File (7/3/2024)    Received from [facility]    Financial Resource Strain     Financial Resource Strain: 0   Food Insecurity: No Food Insecurity (2025)    Hunger Vital Sign     Worried About Running Out of Food in the Last Year: Never true     Ran Out of Food in the Last Year: Never true   Transportation Needs: Not on File (7/3/2024)    Received from [facility]    Transportation Needs     Transportation: 0   Physical Activity: Not on File (7/3/2024)    Received from [facility]    Physical Activity     Physical Activity: 0   Stress: No Stress Concern Present (2025)    [employer] [place] [employer] - Occupational Stress Questionnaire     Feeling of Stress : Not at all   Social Connections: Not on File (2024)    Received from [facility]  Connections     Connectedness: 0   Intimate Partner Violence: Not At Risk (2025)    Humiliation, Afraid, Rape, and Kick questionnaire     Fear of Current or Ex-Partner: No     Emotionally Abused: No     Physically Abused: No     Sexually Abused: No      [place]  Depression Scale Total: 5    Objective   Physical Exam  Weight: (!) 42.2 kg (93 lb)  TW kg (0 lb)   [place] BMI: 18.16  BP: 122/72    Physical Exam  Constitutional:       Appearance: Normal appearance.   HENT:      Head: Normocephalic and atraumatic.      Mouth/Throat:      Mouth: Mucous membranes are moist.   Cardiovascular:      Rate and Rhythm: Normal rate and regular rhythm.      Heart sounds: Normal heart sounds.   Pulmonary:      Effort: Pulmonary effort is normal.      Breath sounds: Normal breath sounds.   Musculoskeletal:         General: Normal range of motion.      Cervical back: Normal range of motion and neck supple.   Neurological:      Mental Status: She is alert and oriented to person, place, and time.   Skin:     General: Skin is warm and dry.   Psychiatric:         Mood and Affect: Mood normal.         Behavior: Behavior normal.         Thought Content: Thought content normal.         Judgment: Judgment normal.          Assessment   Problem List Items Addressed This Visit       Depression during pregnancy in first trimester ([facility])    Overview     Formatting of this note might be different from the original. 2021 states has support, friend currently 6 mo preg counsels her as well. professional assistance offered.         10 weeks gestation of pregnancy ([facility]) - Primary    Overview     Desired provider in labor: [] CNM  [] Physician   [] Either Acceptable  [x] Blood Products: [x] Yes, accepts [] No, needs counseling  [x] Initial BMI: 18.16   [] Prenatal Labs:   [x] Cervical Cancer Screening up to date due; declined at NOB  [] Rh status:   [] Screen for IPV and Substance Use Risk:  [] Genetic Screening (cfDNA):     [] First Trimester Anatomy Screen (11-13.6 wks):  [] Baby ASA (initiated):  [] Pregnancy dated by:     [] Anatomy US: (19-20 wks)  [] Federal Sterilization consent signed (if indicated):  [] 1hr GCT at 24-28wks:  [] Rhogam (if indicated):   [] Fetal Surveillance (if indicated):  [] Tdap (27-32 wks, may be given up to 36 wks if initial window missed):   [] RSV (32-36 wks) (Sept. to end of ):     [] Feeding Intentions:  [] Postpartum Birth control method:   [] GBS at 36 - 37 wks:  [] 39 weeks discussion of IOL vs. Expectant management:  [] Mode of delivery ( anticipated ):           Relevant Medications    prenatal no.144-folic acid (Prenatal) 400 mcg tablet,chewable    Other Relevant Orders    US MAC OB imaging order    CBC Anemia Panel With Reflex    Hep B Core Ab, Total    Hep B surface Ag    Hep B Surface Ab    Hep C Ab    HIV 1/2 Screen with Reflex    Hgb  A1C    Hemoglobin identification with path review    Rubella Ab, IgG    Syphilis Screen with Reflex    Type And Screen Is this order related to pregnancy or an upcoming surgery? No    Urine Culture clinic collect    C. trachomatis / N. gonorrhoeae, Amplified, Urogenital    Follow Up 4 weeks    Nausea and vomiting in pregnancy prior to 22 weeks gestation    Relevant Medications    ondansetron ODT (Zofran-ODT) 4 mg disintegrating tablet    BMI < 18.5    Relevant Orders    Referral to Nutrition Services    Uterine scar from previous  delivery affecting pregnancy ([facility])    Overview     Desires to TOLAC if she is still a good candidate post left salpingectomy         History of unilateral salpingectomy    Overview     Left due to ectopic pregnancy         Previous baby with fetal growth restriction        Plan   - New OB resources provided and reviewed with particular attention to dietary, travel, and medication restrictions  - Oriented to practice, CNM vs. MD care  - Reviewed IOM recommendations for weight gain given pt's BMI: 28-40 pounds (BMI  less than 18.5)  - Reviewed bleeding precautions, warning signs, when to call provider; phone number provided  - The following Rx were sent to pharmacy: PNV, Zofran  - Routine NOB labs ordered  - Nuchal Translucency ultrasound ordered  - Return in 4 weeks for routine prenatal care    DARRYL Mata-ASHUM

## 2025-02-04 NOTE — LETTER
2/4/2025    To Whom It May Concern:    This is to certify that my patient,Dori Marroquin is under my care for her pregnancy.    The following guidelines may be used for any dental work:  You may use a local anesthetic with or without Epinephrine.  You may prescribe any Penicillin or Cephalosporin if an antibiotic is necessary. (Dependent on allergy history)  You may take x-rays with a lead apron if necessary.  You may prescribe Tylenol and/or narcotics for pain.  You may extract teeth if necessary.    If you need further information or if you have any concerns, please contact our office.    Sincerely,        IRMA Mata  [facility]

## 2025-02-04 NOTE — LETTER
2/4/2025    To Whom It May Concern:    Dori Marroquin is being followed for her pregnancy at [facility].  Estimated Date of Delivery: 9/1/25    Sincerely,        IRMA Mata  [facility]

## 2025-02-05 ENCOUNTER — DOCUMENTATION (OUTPATIENT)
Dept: OBSTETRICS AND GYNECOLOGY | Facility: HOSPITAL | Age: 30
End: 2025-02-05
Payer: MEDICAID

## 2025-02-05 NOTE — PROGRESS NOTES
RN faxed document to [employer] for FMLA. Document submitted to be scanned to the patient's chart.  ATIF Blackwell

## 2025-02-06 LAB
BACTERIA UR CULT: NORMAL
C TRACH RRNA SPEC QL NAA+PROBE: NOT DETECTED
N GONORRHOEA RRNA SPEC QL NAA+PROBE: NOT DETECTED
QUEST GC CT AMPLIFIED (ALWAYS MESSAGE): NORMAL

## 2025-02-25 ENCOUNTER — LAB (OUTPATIENT)
Dept: LAB | Facility: HOSPITAL | Age: 30
End: 2025-02-25
Payer: MEDICAID

## 2025-02-25 ENCOUNTER — ROUTINE PRENATAL (OUTPATIENT)
Dept: OBSTETRICS AND GYNECOLOGY | Facility: HOSPITAL | Age: 30
End: 2025-02-25
Payer: MEDICAID

## 2025-02-25 ENCOUNTER — HOSPITAL ENCOUNTER (OUTPATIENT)
Dept: RADIOLOGY | Facility: HOSPITAL | Age: 30
Discharge: HOME | End: 2025-02-25
Payer: MEDICAID

## 2025-02-25 ENCOUNTER — APPOINTMENT (OUTPATIENT)
Dept: OBSTETRICS AND GYNECOLOGY | Facility: HOSPITAL | Age: 30
End: 2025-02-25
Payer: MEDICAID

## 2025-02-25 VITALS — DIASTOLIC BLOOD PRESSURE: 62 MMHG | WEIGHT: 97 LBS | BODY MASS INDEX: 18.94 KG/M2 | SYSTOLIC BLOOD PRESSURE: 100 MMHG

## 2025-02-25 DIAGNOSIS — Z34.80 ENCOUNTER FOR SUPERVISION OF OTHER NORMAL PREGNANCY, UNSPECIFIED TRIMESTER (HHS-HCC): Primary | ICD-10-CM

## 2025-02-25 DIAGNOSIS — Z87.59 PREVIOUS BABY WITH FETAL GROWTH RESTRICTION: ICD-10-CM

## 2025-02-25 DIAGNOSIS — O09.91 SUPERVISION OF HIGH RISK PREGNANCY IN FIRST TRIMESTER (HHS-HCC): Primary | ICD-10-CM

## 2025-02-25 DIAGNOSIS — Z3A.13 13 WEEKS GESTATION OF PREGNANCY (HHS-HCC): ICD-10-CM

## 2025-02-25 DIAGNOSIS — Z3A.10 10 WEEKS GESTATION OF PREGNANCY (HHS-HCC): ICD-10-CM

## 2025-02-25 DIAGNOSIS — O26.891 OTHER SPECIFIED PREGNANCY RELATED CONDITIONS, FIRST TRIMESTER (HHS-HCC): ICD-10-CM

## 2025-02-25 PROBLEM — O00.80 PREGNANCY, ECTOPIC, CORNUAL OR CERVICAL: Status: RESOLVED | Noted: 2023-10-18 | Resolved: 2023-10-19

## 2025-02-25 PROBLEM — O09.11: Status: ACTIVE | Noted: 2023-10-18

## 2025-02-25 LAB
ABO GROUP (TYPE) IN BLOOD: NORMAL
ANTIBODY SCREEN: NORMAL
ERYTHROCYTE [DISTWIDTH] IN BLOOD BY AUTOMATED COUNT: 12.5 % (ref 11.5–14.5)
FERRITIN SERPL-MCNC: 62 NG/ML
FOLATE SERPL-MCNC: >24 NG/ML
HCT VFR BLD AUTO: 31.6 % (ref 36–46)
HGB BLD-MCNC: 10.7 G/DL (ref 12–16)
IRON SATN MFR SERPL: 45 %
IRON SERPL-MCNC: 151 UG/DL
MCH RBC QN AUTO: 31.2 PG (ref 26–34)
MCHC RBC AUTO-ENTMCNC: 33.9 G/DL (ref 32–36)
MCV RBC AUTO: 92 FL (ref 80–100)
NRBC BLD-RTO: 0 /100 WBCS (ref 0–0)
PLATELET # BLD AUTO: 317 X10*3/UL (ref 150–450)
RBC # BLD AUTO: 3.43 X10*6/UL (ref 4–5.2)
REFLEX ADDED, ANEMIA PANEL: NORMAL
RH FACTOR (ANTIGEN D): NORMAL
TIBC SERPL-MCNC: 337 UG/DL
UIBC SERPL-MCNC: 186 UG/DL
VIT B12 SERPL-MCNC: 517 PG/ML
WBC # BLD AUTO: 9.4 X10*3/UL (ref 4.4–11.3)

## 2025-02-25 PROCEDURE — 99214 OFFICE O/P EST MOD 30 MIN: CPT | Performed by: STUDENT IN AN ORGANIZED HEALTH CARE EDUCATION/TRAINING PROGRAM

## 2025-02-25 PROCEDURE — 86900 BLOOD TYPING SEROLOGIC ABO: CPT

## 2025-02-25 PROCEDURE — 83021 HEMOGLOBIN CHROMOTOGRAPHY: CPT

## 2025-02-25 PROCEDURE — 85027 COMPLETE CBC AUTOMATED: CPT

## 2025-02-25 PROCEDURE — 86850 RBC ANTIBODY SCREEN: CPT

## 2025-02-25 PROCEDURE — 82607 VITAMIN B-12: CPT

## 2025-02-25 PROCEDURE — 76813 OB US NUCHAL MEAS 1 GEST: CPT

## 2025-02-25 PROCEDURE — 82746 ASSAY OF FOLIC ACID SERUM: CPT

## 2025-02-25 PROCEDURE — 76813 OB US NUCHAL MEAS 1 GEST: CPT | Performed by: OBSTETRICS & GYNECOLOGY

## 2025-02-25 PROCEDURE — 82728 ASSAY OF FERRITIN: CPT

## 2025-02-25 PROCEDURE — 83550 IRON BINDING TEST: CPT

## 2025-02-25 PROCEDURE — 99214 OFFICE O/P EST MOD 30 MIN: CPT | Mod: TH | Performed by: STUDENT IN AN ORGANIZED HEALTH CARE EDUCATION/TRAINING PROGRAM

## 2025-02-25 PROCEDURE — 86901 BLOOD TYPING SEROLOGIC RH(D): CPT

## 2025-02-25 NOTE — ASSESSMENT & PLAN NOTE
- will complete prenatal panel today  - cf DNA also ordered to complete today  - First Trimester anatomy US completed today, report pending at the time of this writing however no concerns per patient report

## 2025-02-25 NOTE — PROGRESS NOTES
Ob Visit  25     SUBJECTIVE      HPI: Dori Marroquin is a 29 y.o.  at 13w1d here for RPNV.  She has no contractions, no bleeding, and no LOF. Patient reports nausea has not been a problem this pregnancy as opposed to her first when it was a significant symptom for her. Has been quite fatigued however. Hadn't had a chance to obtain initial labs yet but is amenable to complete today.       OBJECTIVE  Visit Vitals  /62   Wt (!) 44 kg (97 lb)   LMP 2024   BMI 18.94 kg/m²   OB Status Pregnant   Smoking Status Never   BSA 1.36 m²            ASSESSMENT & PLAN    Dori Marroquin is a 29 y.o.  at 13w1d here for the following concerns we addressed today:    13 weeks gestation of pregnancy ([facility])  - will complete prenatal panel today  - cf DNA also ordered to complete today  - First Trimester anatomy US completed today, report pending at the time of this writing however no concerns per patient report        Orders Placed This Encounter   Procedures    [facility] Prenatal Screen       RTC in 4 weeks      Gertrude Delgado MD

## 2025-02-26 DIAGNOSIS — O99.011 ANEMIA DURING PREGNANCY IN FIRST TRIMESTER (HHS-HCC): Primary | ICD-10-CM

## 2025-02-26 PROBLEM — Z78.9 NOT IMMUNE TO HEPATITIS B VIRUS: Status: ACTIVE | Noted: 2025-02-26

## 2025-02-26 LAB
HEMOGLOBIN A2: 3.3 % (ref 2–3.5)
HEMOGLOBIN A: 96.2 % (ref 95.8–98)
HEMOGLOBIN F: 0.5 % (ref 0–2)
HEMOGLOBIN IDENTIFICATION INTERPRETATION: NORMAL
PATH REVIEW-HGB IDENTIFICATION: NORMAL

## 2025-02-26 RX ORDER — FERROUS SULFATE 325(65) MG
325 TABLET ORAL
Qty: 90 TABLET | Refills: 3 | Status: SHIPPED | OUTPATIENT
Start: 2025-02-26 | End: 2026-02-26

## 2025-02-28 ENCOUNTER — TELEPHONE (OUTPATIENT)
Dept: OBSTETRICS AND GYNECOLOGY | Facility: HOSPITAL | Age: 30
End: 2025-02-28
Payer: MEDICAID

## 2025-02-28 LAB
EST. AVERAGE GLUCOSE BLD GHB EST-MCNC: 103 MG/DL
EST. AVERAGE GLUCOSE BLD GHB EST-SCNC: 5.7 MMOL/L
HBA1C MFR BLD: 5.2 % OF TOTAL HGB
HBV CORE AB SERPL QL IA: NORMAL
HBV SURFACE AB SERPL IA-ACNC: <5 MIU/ML
HBV SURFACE AG SERPL QL IA: NORMAL
HCV AB SERPL QL IA: NORMAL
HIV 1+2 AB+HIV1 P24 AG SERPL QL IA: NORMAL
RUBV IGG SERPL IA-ACNC: 2.91 INDEX
T PALLIDUM AB SER QL IA: NEGATIVE

## 2025-02-28 NOTE — TELEPHONE ENCOUNTER
RN called pt to discuss results of anemia. Left message to call the office. Message also sent to sena Blackwell RN

## 2025-02-28 NOTE — TELEPHONE ENCOUNTER
----- Message from Cecelia Rebollar sent at 2/26/2025  8:22 AM EST -----  Borderline anemia with hemoglobin of 10.7  PO iron sent to pharmacy on file for her to begin ASAP

## 2025-03-04 ENCOUNTER — DOCUMENTATION (OUTPATIENT)
Dept: OBSTETRICS AND GYNECOLOGY | Facility: HOSPITAL | Age: 30
End: 2025-03-04
Payer: MEDICAID

## 2025-03-04 NOTE — PROGRESS NOTES
Missing genetic testing order documents faxed to Lolis Aguayo from [employer].  ATIF Blackwell

## 2025-03-04 NOTE — TELEPHONE ENCOUNTER
RN called pt to discuss results.   Pt aname and  verified.   Pt informed her repeat labs confirm borderline anemia.   Pt send a prescription for PO iron.   Pharmacy verified.   Iron is best absorbed when taken on an empty stomach with water or fruit juice, about 1 hour before or 2 hours after meals  Milk, calcium and antacids should not be taken at the same time as iron supplements.  Pt informed of side effect of constipation and taking along with a stool softener can help  Pt verbalized understanding.  All questions and concerns addressed.   ATIF Blackwell

## 2025-03-07 LAB
COMMENTS - MP RESULT TYPE: NORMAL
SCAN RESULT: NORMAL

## 2025-03-24 ENCOUNTER — ROUTINE PRENATAL (OUTPATIENT)
Dept: OBSTETRICS AND GYNECOLOGY | Facility: HOSPITAL | Age: 30
End: 2025-03-24
Payer: MEDICAID

## 2025-03-24 VITALS — WEIGHT: 103 LBS | SYSTOLIC BLOOD PRESSURE: 116 MMHG | BODY MASS INDEX: 20.12 KG/M2 | DIASTOLIC BLOOD PRESSURE: 73 MMHG

## 2025-03-24 DIAGNOSIS — O34.219 UTERINE SCAR FROM PREVIOUS CESAREAN DELIVERY AFFECTING PREGNANCY (HHS-HCC): ICD-10-CM

## 2025-03-24 DIAGNOSIS — Z3A.17 17 WEEKS GESTATION OF PREGNANCY (HHS-HCC): Primary | ICD-10-CM

## 2025-03-24 PROBLEM — O21.9 NAUSEA AND VOMITING IN PREGNANCY PRIOR TO 22 WEEKS GESTATION: Status: RESOLVED | Noted: 2025-02-04 | Resolved: 2025-03-24

## 2025-03-24 PROCEDURE — 99214 OFFICE O/P EST MOD 30 MIN: CPT | Mod: TH | Performed by: STUDENT IN AN ORGANIZED HEALTH CARE EDUCATION/TRAINING PROGRAM

## 2025-03-24 PROCEDURE — 99214 OFFICE O/P EST MOD 30 MIN: CPT | Performed by: STUDENT IN AN ORGANIZED HEALTH CARE EDUCATION/TRAINING PROGRAM

## 2025-03-24 NOTE — PROGRESS NOTES
Ob Visit  25     SUBJECTIVE      HPI: Dori Marroquin is a 29 y.o.  at 17w0d here for RPNV.  She has no contractions, bleeding, or LOF. Reports some fetal movement. Patient reports hip pain when sleeping but otherwise doing well.     OBJECTIVE  Visit Vitals  /73   Wt 46.7 kg (103 lb)   LMP 2024   BMI 20.12 kg/m²   OB Status Pregnant   Smoking Status Never   BSA 1.41 m²        ASSESSMENT & PLAN    Dori Marroquin is a 29 y.o.  at 17w0d here for the following concerns we addressed today:    Problem List Items Addressed This Visit       17 weeks gestation of pregnancy ([facility]) - Primary    Overview     Desired provider in labor: [] CNM  [x] Physician -- pre-labor c/s required [] Either Acceptable  [x] Blood Products: [x] Yes, accepts [] No, needs counseling  [x] Initial BMI: 18.16   [x] Prenatal Labs  [x] Cervical Cancer Screening up to date declined at NOB, need pp  [x] Rh status: positive  [x] Screen for IPV and Substance Use Risk:  [x] Genetic Screening (cfDNA): ordered 25  [x] First Trimester Anatomy Screen (11-13.6 wks): completed 25  [] Baby ASA (initiated):   [x] Pregnancy dated by: LMP c/w 7 wk US    [] Anatomy US: (19-20 wks)  [] Federal Sterilization consent signed (if indicated):  [] 1hr GCT at 24-28wks:  [] Rhogam (if indicated):   [] Fetal Surveillance (if indicated):  [] Tdap (27-32 wks, may be given up to 36 wks if initial window missed):     [] Feeding Intentions:  [x] Postpartum Birth control method: declines but may consider POPs  [] GBS at 36 - 37 wks:  [] 39 weeks discussion of IOL vs. Expectant management:  [x] Mode of delivery ( anticipated ): needs repeat c/s at 37 weeks for h/o cornual wedge resection for ectopic         Uterine scar from previous  delivery affecting pregnancy ([facility])    Overview     - Trial of labor is NOT recommended due to left cornual wedge resection for left cornual ectopic pregnancy  - RCS recommended by 37w0d due to  high risk of uterine rupture in the event of labor            RTC in 4 weeks    Franci Reyes MD

## 2025-04-08 ENCOUNTER — HOSPITAL ENCOUNTER (OUTPATIENT)
Dept: RADIOLOGY | Facility: HOSPITAL | Age: 30
Discharge: HOME | End: 2025-04-08
Payer: MEDICAID

## 2025-04-08 DIAGNOSIS — Z03.73 ENCOUNTER FOR SUSPECTED FETAL ANOMALY RULED OUT: ICD-10-CM

## 2025-04-08 DIAGNOSIS — Z3A.10 10 WEEKS GESTATION OF PREGNANCY (HHS-HCC): ICD-10-CM

## 2025-04-08 PROCEDURE — 76805 OB US >/= 14 WKS SNGL FETUS: CPT | Performed by: STUDENT IN AN ORGANIZED HEALTH CARE EDUCATION/TRAINING PROGRAM

## 2025-04-08 PROCEDURE — 76805 OB US >/= 14 WKS SNGL FETUS: CPT

## 2025-04-21 ENCOUNTER — APPOINTMENT (OUTPATIENT)
Dept: OBSTETRICS AND GYNECOLOGY | Facility: HOSPITAL | Age: 30
End: 2025-04-21
Payer: MEDICAID

## 2025-05-06 ENCOUNTER — ROUTINE PRENATAL (OUTPATIENT)
Dept: OBSTETRICS AND GYNECOLOGY | Facility: HOSPITAL | Age: 30
End: 2025-05-06
Payer: COMMERCIAL

## 2025-05-06 VITALS — SYSTOLIC BLOOD PRESSURE: 108 MMHG | BODY MASS INDEX: 21.68 KG/M2 | WEIGHT: 111 LBS | DIASTOLIC BLOOD PRESSURE: 70 MMHG

## 2025-05-06 DIAGNOSIS — O09.892 SUPERVISION OF OTHER HIGH RISK PREGNANCIES, SECOND TRIMESTER (HHS-HCC): Primary | ICD-10-CM

## 2025-05-06 DIAGNOSIS — Z3A.23 23 WEEKS GESTATION OF PREGNANCY (HHS-HCC): ICD-10-CM

## 2025-05-06 DIAGNOSIS — O34.219 UTERINE SCAR FROM PREVIOUS CESAREAN DELIVERY AFFECTING PREGNANCY (HHS-HCC): ICD-10-CM

## 2025-05-06 RX ORDER — FOLIC ACID, .BETA.-CAROTENE, ASCORBIC ACID, CHOLECALCIFEROL, .ALPHA.-TOCOPHEROL ACETATE, DL-, THIAMINE MONONITRATE, RIBOFLAVIN, NIACINAMIDE, PYRIDOXINE HYDROCHLORIDE, CYANOCOBALAMIN, CALCIUM PANTOTHENATE, CALCIUM CARBONATE, FERROUS FUMARATE, AND ZINC OXIDE 1; 1000; 100; 400; 30; 3; 3; 15; 20; 12; 7; 200; 29; 20 MG/1; [IU]/1; MG/1; [IU]/1; [IU]/1; MG/1; MG/1; MG/1; MG/1; UG/1; MG/1; MG/1; MG/1; MG/1
TABLET, CHEWABLE ORAL
COMMUNITY
Start: 2025-05-04

## 2025-05-06 NOTE — PROGRESS NOTES
"Ob Visit  25     SUBJECTIVE      HPI: Dori Marorquin is a 29 y.o.  at 23w1d here for RPNV.  She has no contractions, no bleeding, and no LOF. Reports normal fetal movement.  Patient reports no new problems or concerns.       OBJECTIVE  Visit Vitals  /70   Wt 50.3 kg (111 lb)   LMP 2024   BMI 21.68 kg/m²   OB Status Pregnant   Smoking Status Never   BSA 1.46 m²            ASSESSMENT & PLAN    Dori Marroquin is a 29 y.o.  at 23w1d here for the following concerns we addressed today:    Uterine scar from previous  delivery affecting pregnancy ([facility])  - patient previously unaware of need for  delivery. Operative report from cornual wedge resection was reviewed with patient today including surgeon note of recommendation against labor in any future pregnancy due to degree of myometrium disrupted with wedge resection  - patient had highly desired TOLAC and was tearful with this news  - support and validation expressed and encouraged considerations of potential practices at time of delivery which could improve her birth experiences. Encouraged to consider a  \"birth plan\" and bring to discuss NV    23 weeks gestation of pregnancy ([facility])  - care complete to date, no changes indicated        Orders Placed This Encounter   Procedures    Glucose, 1 Hour Screen, Pregnancy    CBC Anemia Panel With Reflex,Pregnancy    Syphilis Screen with Reflex       RTC in 4 weeks      Gertrude Delgado MD   "

## 2025-05-09 PROBLEM — Z3A.23 23 WEEKS GESTATION OF PREGNANCY (HHS-HCC): Status: ACTIVE | Noted: 2025-02-04

## 2025-05-09 NOTE — ASSESSMENT & PLAN NOTE
"- patient previously unaware of need for  delivery. Operative report from cornual wedge resection was reviewed with patient today including surgeon note of recommendation against labor in any future pregnancy due to degree of myometrium disrupted with wedge resection  - patient had highly desired TOLAC and was tearful with this news  - support and validation expressed and encouraged considerations of potential practices at time of delivery which could improve her birth experiences. Encouraged to consider a  \"birth plan\" and bring to discuss NV  "

## 2025-05-31 ENCOUNTER — DOCUMENTATION (OUTPATIENT)
Dept: OBSTETRICS AND GYNECOLOGY | Facility: HOSPITAL | Age: 30
End: 2025-05-31
Payer: COMMERCIAL

## 2025-05-31 ENCOUNTER — HOSPITAL ENCOUNTER (OUTPATIENT)
Facility: HOSPITAL | Age: 30
End: 2025-05-31
Attending: STUDENT IN AN ORGANIZED HEALTH CARE EDUCATION/TRAINING PROGRAM | Admitting: STUDENT IN AN ORGANIZED HEALTH CARE EDUCATION/TRAINING PROGRAM
Payer: COMMERCIAL

## 2025-05-31 ENCOUNTER — HOSPITAL ENCOUNTER (OUTPATIENT)
Facility: HOSPITAL | Age: 30
Discharge: HOME | End: 2025-05-31
Attending: STUDENT IN AN ORGANIZED HEALTH CARE EDUCATION/TRAINING PROGRAM | Admitting: STUDENT IN AN ORGANIZED HEALTH CARE EDUCATION/TRAINING PROGRAM
Payer: COMMERCIAL

## 2025-05-31 VITALS
HEIGHT: 60 IN | RESPIRATION RATE: 17 BRPM | SYSTOLIC BLOOD PRESSURE: 105 MMHG | WEIGHT: 120.15 LBS | OXYGEN SATURATION: 97 % | HEART RATE: 72 BPM | TEMPERATURE: 97.5 F | DIASTOLIC BLOOD PRESSURE: 73 MMHG | BODY MASS INDEX: 23.59 KG/M2

## 2025-05-31 DIAGNOSIS — R55 SYNCOPE, UNSPECIFIED SYNCOPE TYPE: Primary | ICD-10-CM

## 2025-05-31 LAB
ALBUMIN SERPL BCP-MCNC: 3.8 G/DL (ref 3.4–5)
ALP SERPL-CCNC: 79 U/L (ref 33–110)
ALT SERPL W P-5'-P-CCNC: 7 U/L (ref 7–45)
ANION GAP SERPL CALC-SCNC: 12 MMOL/L (ref 10–20)
AST SERPL W P-5'-P-CCNC: 12 U/L (ref 9–39)
BILIRUB SERPL-MCNC: 0.2 MG/DL (ref 0–1.2)
BUN SERPL-MCNC: 13 MG/DL (ref 6–23)
CALCIUM SERPL-MCNC: 9.4 MG/DL (ref 8.6–10.6)
CARDIAC TROPONIN I PNL SERPL HS: <3 NG/L (ref 0–34)
CHLORIDE SERPL-SCNC: 104 MMOL/L (ref 98–107)
CO2 SERPL-SCNC: 22 MMOL/L (ref 21–32)
CREAT SERPL-MCNC: 0.69 MG/DL (ref 0.5–1.05)
EGFRCR SERPLBLD CKD-EPI 2021: >90 ML/MIN/1.73M*2
ERYTHROCYTE [DISTWIDTH] IN BLOOD BY AUTOMATED COUNT: 12.1 % (ref 11.5–14.5)
GLUCOSE SERPL-MCNC: 83 MG/DL (ref 74–99)
HCT VFR BLD AUTO: 32.3 % (ref 36–46)
HGB BLD-MCNC: 10.5 G/DL (ref 12–16)
MCH RBC QN AUTO: 32 PG (ref 26–34)
MCHC RBC AUTO-ENTMCNC: 32.5 G/DL (ref 32–36)
MCV RBC AUTO: 99 FL (ref 80–100)
NRBC BLD-RTO: 0 /100 WBCS (ref 0–0)
PLATELET # BLD AUTO: 312 X10*3/UL (ref 150–450)
POTASSIUM SERPL-SCNC: 4 MMOL/L (ref 3.5–5.3)
PROT SERPL-MCNC: 6.8 G/DL (ref 6.4–8.2)
RBC # BLD AUTO: 3.28 X10*6/UL (ref 4–5.2)
SODIUM SERPL-SCNC: 134 MMOL/L (ref 136–145)
WBC # BLD AUTO: 10.8 X10*3/UL (ref 4.4–11.3)

## 2025-05-31 PROCEDURE — 2500000001 HC RX 250 WO HCPCS SELF ADMINISTERED DRUGS (ALT 637 FOR MEDICARE OP)

## 2025-05-31 PROCEDURE — 99214 OFFICE O/P EST MOD 30 MIN: CPT | Mod: 25

## 2025-05-31 PROCEDURE — 36415 COLL VENOUS BLD VENIPUNCTURE: CPT

## 2025-05-31 PROCEDURE — 80053 COMPREHEN METABOLIC PANEL: CPT

## 2025-05-31 PROCEDURE — 85027 COMPLETE CBC AUTOMATED: CPT

## 2025-05-31 PROCEDURE — 99214 OFFICE O/P EST MOD 30 MIN: CPT

## 2025-05-31 PROCEDURE — 59025 FETAL NON-STRESS TEST: CPT | Mod: GC

## 2025-05-31 PROCEDURE — 59025 FETAL NON-STRESS TEST: CPT

## 2025-05-31 PROCEDURE — 86780 TREPONEMA PALLIDUM: CPT

## 2025-05-31 PROCEDURE — 84484 ASSAY OF TROPONIN QUANT: CPT

## 2025-05-31 RX ORDER — LABETALOL HYDROCHLORIDE 5 MG/ML
20 INJECTION, SOLUTION INTRAVENOUS ONCE AS NEEDED
Status: DISCONTINUED | OUTPATIENT
Start: 2025-05-31 | End: 2025-06-01 | Stop reason: HOSPADM

## 2025-05-31 RX ORDER — ONDANSETRON HYDROCHLORIDE 2 MG/ML
4 INJECTION, SOLUTION INTRAVENOUS EVERY 6 HOURS PRN
Status: DISCONTINUED | OUTPATIENT
Start: 2025-05-31 | End: 2025-06-01 | Stop reason: HOSPADM

## 2025-05-31 RX ORDER — HYDRALAZINE HYDROCHLORIDE 20 MG/ML
5 INJECTION INTRAMUSCULAR; INTRAVENOUS ONCE AS NEEDED
Status: DISCONTINUED | OUTPATIENT
Start: 2025-05-31 | End: 2025-06-01 | Stop reason: HOSPADM

## 2025-05-31 RX ORDER — LIDOCAINE HYDROCHLORIDE 10 MG/ML
0.5 INJECTION, SOLUTION INFILTRATION; PERINEURAL ONCE AS NEEDED
Status: DISCONTINUED | OUTPATIENT
Start: 2025-05-31 | End: 2025-06-01 | Stop reason: HOSPADM

## 2025-05-31 RX ORDER — ACETAMINOPHEN 325 MG/1
975 TABLET ORAL ONCE
Status: COMPLETED | OUTPATIENT
Start: 2025-05-31 | End: 2025-05-31

## 2025-05-31 RX ORDER — ONDANSETRON 4 MG/1
4 TABLET, FILM COATED ORAL EVERY 6 HOURS PRN
Status: DISCONTINUED | OUTPATIENT
Start: 2025-05-31 | End: 2025-06-01 | Stop reason: HOSPADM

## 2025-05-31 RX ADMIN — ACETAMINOPHEN 975 MG: 325 TABLET ORAL at 21:37

## 2025-05-31 SDOH — HEALTH STABILITY: MENTAL HEALTH: HAVE YOU USED ANY PRESCRIPTION DRUGS OTHER THAN PRESCRIBED IN THE PAST 12 MONTHS?: NO

## 2025-05-31 SDOH — HEALTH STABILITY: MENTAL HEALTH: WISH TO BE DEAD (PAST 1 MONTH): NO

## 2025-05-31 SDOH — SOCIAL STABILITY: SOCIAL INSECURITY: HAVE YOU HAD ANY THOUGHTS OF HARMING ANYONE ELSE?: NO

## 2025-05-31 SDOH — SOCIAL STABILITY: SOCIAL INSECURITY: ARE YOU OR HAVE YOU BEEN THREATENED OR ABUSED PHYSICALLY, EMOTIONALLY, OR SEXUALLY BY ANYONE?: NO

## 2025-05-31 SDOH — SOCIAL STABILITY: SOCIAL INSECURITY: DOES ANYONE TRY TO KEEP YOU FROM HAVING/CONTACTING OTHER FRIENDS OR DOING THINGS OUTSIDE YOUR HOME?: NO

## 2025-05-31 SDOH — HEALTH STABILITY: MENTAL HEALTH: NON-SPECIFIC ACTIVE SUICIDAL THOUGHTS (PAST 1 MONTH): NO

## 2025-05-31 SDOH — HEALTH STABILITY: MENTAL HEALTH: SUICIDAL BEHAVIOR (LIFETIME): NO

## 2025-05-31 SDOH — SOCIAL STABILITY: SOCIAL INSECURITY: VERBAL ABUSE: DENIES

## 2025-05-31 SDOH — SOCIAL STABILITY: SOCIAL INSECURITY: PHYSICAL ABUSE: DENIES

## 2025-05-31 SDOH — ECONOMIC STABILITY: HOUSING INSECURITY: DO YOU FEEL UNSAFE GOING BACK TO THE PLACE WHERE YOU ARE LIVING?: NO

## 2025-05-31 SDOH — HEALTH STABILITY: MENTAL HEALTH: HAVE YOU USED ANY SUBSTANCES (CANABIS, COCAINE, HEROIN, HALLUCINOGENS, INHALANTS, ETC.) IN THE PAST 12 MONTHS?: NO

## 2025-05-31 SDOH — SOCIAL STABILITY: SOCIAL INSECURITY: HAVE YOU HAD THOUGHTS OF HARMING ANYONE ELSE?: NO

## 2025-05-31 SDOH — SOCIAL STABILITY: SOCIAL INSECURITY: HAS ANYONE EVER THREATENED TO HURT YOUR FAMILY OR YOUR PETS?: NO

## 2025-05-31 SDOH — SOCIAL STABILITY: SOCIAL INSECURITY: DO YOU FEEL ANYONE HAS EXPLOITED OR TAKEN ADVANTAGE OF YOU FINANCIALLY OR OF YOUR PERSONAL PROPERTY?: NO

## 2025-05-31 SDOH — HEALTH STABILITY: MENTAL HEALTH: WERE YOU ABLE TO COMPLETE ALL THE BEHAVIORAL HEALTH SCREENINGS?: YES

## 2025-05-31 SDOH — SOCIAL STABILITY: SOCIAL INSECURITY: ABUSE SCREEN: ADULT

## 2025-05-31 SDOH — SOCIAL STABILITY: SOCIAL INSECURITY: ARE THERE ANY APPARENT SIGNS OF INJURIES/BEHAVIORS THAT COULD BE RELATED TO ABUSE/NEGLECT?: NO

## 2025-05-31 ASSESSMENT — PAIN SCALES - GENERAL
PAINLEVEL_OUTOF10: 8
PAINLEVEL_OUTOF10: 0 - NO PAIN
PAINLEVEL_OUTOF10: 8
PAINLEVEL_OUTOF10: 1

## 2025-05-31 ASSESSMENT — LIFESTYLE VARIABLES
AUDIT-C TOTAL SCORE: 0
HOW OFTEN DO YOU HAVE A DRINK CONTAINING ALCOHOL: NEVER
HOW MANY STANDARD DRINKS CONTAINING ALCOHOL DO YOU HAVE ON A TYPICAL DAY: PATIENT DOES NOT DRINK
AUDIT-C TOTAL SCORE: 0
SKIP TO QUESTIONS 9-10: 1
HOW OFTEN DO YOU HAVE 6 OR MORE DRINKS ON ONE OCCASION: NEVER

## 2025-05-31 ASSESSMENT — PATIENT HEALTH QUESTIONNAIRE - PHQ9
SUM OF ALL RESPONSES TO PHQ9 QUESTIONS 1 & 2: 0
1. LITTLE INTEREST OR PLEASURE IN DOING THINGS: NOT AT ALL
2. FEELING DOWN, DEPRESSED OR HOPELESS: NOT AT ALL

## 2025-05-31 NOTE — PROGRESS NOTES
Patient called answering service.     Patient reports last night she was having chest pain and a headache. She did take Tylenol 500mg last night and the headache went away.   She was able to get up this morning and felt fine. When she got up this evening to go out of house she reports the chest pain coming back and feeling dizziness. She states she did pass out and her dad told her go back into the house.   She yulia currently having chest pain.     I instructed her to come in right away for evaluation.     Radha Ames MD

## 2025-06-01 LAB — TREPONEMA PALLIDUM IGG+IGM AB [PRESENCE] IN SERUM OR PLASMA BY IMMUNOASSAY: NONREACTIVE

## 2025-06-01 NOTE — H&P
OB Triage H&P    Assessment/Plan    Dori Marroquin is a 29 y.o.  at 26w5d by 7 w US presents to triage with the headache, chest pain, passing out this afternoon.    Chest Pain  Chest pain for 1-2 minutes in the middle of her chest to her left side on resting and exertion yesterday and today. Not accompanied by discernible palpitations, dyspnea, SOB, dizziness. History of SVT during CS in . Workup at the time: Echo  LVEF 55-60%, otherwise wnl, Prolonged monitoring with no abnormal findings. Cardiology recommendation: low risk patient, return to cardiology clinic if fast heart rates become an issue for the patient. On exam tenderness on palpation of the area patient indicated.  - EKG: sinus rhythm.  - Troponin <3  - Hgb: 10.5, CMP wnl  - Tenderness resolved after Tylenol, Flexeril  - Outpatient referral to Cardiology    Syncope  Describes feeling faint, sitting down and passing out while on exertion today.  Orthostatic vitals indicate orthostatic hypotension. (Standing 98/58, supine 116/75)  - PO fluid recommended.    Headache  8/10 headache yesterday which resolved with Tylenol. Reports 10/10 left sided headache today.  - Tylenol, Flexeril given, headache resolved completely.    IUP at 26w5d  - Up to date on prenatal care, needs 1 hr  - Anatomy US: normal anatomic survey   - Continue routine prenatal care    Fetal Status  - Good fetal movement   - NST reactive, reassuring  - No contractions on toco    Dispo  -Patient appropriate for discharge home, agrees with plan  -Return precautions discussed   -Follow up at next scheduled OB appointment () or to triage sooner as needed  - Outpatient cardiology referral    Seen and d/w Dr. Eros Weinstein MD PGY2    Subjective     Patient reports she had chest pain for 1-2 minutes in the middle of her chest to her left side while trying to buckle her kid in her car seat. She describes feeling faint, sitting down and passing out. Patient also  reports feeling the same chest pain yesterday when she was waking up from sleep for 1 min. She denies any dizziness, palpitations, shortness of breath at that time.  She also complains of a left sided headache 10/10 today, denies visual disturbances, has not tried taking any medication.  She denies throat pain, cough, sputum, joint pain, abdominal pain, dysuria, constipation. She reports good fetal movement, denies vaginal bleeding, LOF, contractions.       OB History    Para Term  AB Living   4 1 1 0 2 1   SAB IAB Ectopic Multiple Live Births   0 1 1 0 1      # Outcome Date GA Lbr Wilber/2nd Weight Sex Type Anes PTL Lv   4 Current            3 Ectopic 10/22/23           2 Term 22    F CS-LTranv EPI N DONITA   1 IAB 2022 12w0d              Surgical History[1]  Csx1  LS Ectopic: cornual wedge resection    Social History     Tobacco Use    Smoking status: Never    Smokeless tobacco: Never   Substance Use Topics    Alcohol use: Never       Allergies[2]    Prescriptions Prior to Admission[3]  Objective     Last Vitals  Temp Pulse Resp BP MAP O2 Sat   36.4 °C (97.5 °F) 72 17 105/73 84 97 %     Blood Pressures         2025  2019 2025  2127 2025  2128 2025  2152 2025  2302    BP: 109/66 106/60 98/58 116/75 105/73             Physical Exam  General: no acute distress  HEENT: normocephalic, atraumatic  Heart: warm and well perfused  Lungs: no increased WOB  Abd: soft, gravid, nontender, no palpable contractions  Extremities: moving all extremities  Neuro: awake and conversant    Fetal Monitoring  Baseline: 135 bpm, Variability: moderate,  Accelerations: present and Decelerations: none  Uterine Activity: No contractions seen on toco  Interpretation: Reactive                        [1]   Past Surgical History:  Procedure Laterality Date     SECTION, LOW TRANSVERSE  2022    ECTOPIC PREGNANCY SURGERY     [2]   Allergies  Allergen Reactions    Pineapple Hives, Itching,  Other, Shortness of breath and Wheezing    Gel Rash     Ultrasound gel   [3]   No medications prior to admission.

## 2025-06-12 ENCOUNTER — ROUTINE PRENATAL (OUTPATIENT)
Dept: OBSTETRICS AND GYNECOLOGY | Facility: CLINIC | Age: 30
End: 2025-06-12
Payer: COMMERCIAL

## 2025-06-12 VITALS
SYSTOLIC BLOOD PRESSURE: 94 MMHG | WEIGHT: 114.1 LBS | BODY MASS INDEX: 22.28 KG/M2 | DIASTOLIC BLOOD PRESSURE: 64 MMHG | HEART RATE: 97 BPM

## 2025-06-12 DIAGNOSIS — Z3A.29 29 WEEKS GESTATION OF PREGNANCY (HHS-HCC): ICD-10-CM

## 2025-06-12 DIAGNOSIS — O34.29 UTERINE SCAR FROM PREVIOUS SURGERY AFFECTING PREGNANCY (HHS-HCC): Primary | ICD-10-CM

## 2025-06-12 PROCEDURE — 90471 IMMUNIZATION ADMIN: CPT | Performed by: STUDENT IN AN ORGANIZED HEALTH CARE EDUCATION/TRAINING PROGRAM

## 2025-06-12 PROCEDURE — 0501F PRENATAL FLOW SHEET: CPT | Performed by: STUDENT IN AN ORGANIZED HEALTH CARE EDUCATION/TRAINING PROGRAM

## 2025-06-12 ASSESSMENT — ENCOUNTER SYMPTOMS
ENDOCRINE NEGATIVE: 0
CARDIOVASCULAR NEGATIVE: 0
HEMATOLOGIC/LYMPHATIC NEGATIVE: 0
PSYCHIATRIC NEGATIVE: 0
MUSCULOSKELETAL NEGATIVE: 0
RESPIRATORY NEGATIVE: 0
ALLERGIC/IMMUNOLOGIC NEGATIVE: 0
GASTROINTESTINAL NEGATIVE: 0
EYES NEGATIVE: 0
NEUROLOGICAL NEGATIVE: 0
CONSTITUTIONAL NEGATIVE: 0

## 2025-06-12 ASSESSMENT — PATIENT HEALTH QUESTIONNAIRE - PHQ9
2. FEELING DOWN, DEPRESSED OR HOPELESS: NOT AT ALL
SUM OF ALL RESPONSES TO PHQ9 QUESTIONS 1 AND 2: 0
1. LITTLE INTEREST OR PLEASURE IN DOING THINGS: NOT AT ALL

## 2025-06-12 ASSESSMENT — PAIN - FUNCTIONAL ASSESSMENT: PAIN_FUNCTIONAL_ASSESSMENT: 0-10

## 2025-06-12 ASSESSMENT — PAIN SCALES - GENERAL: PAINLEVEL_OUTOF10: 0 - NO PAIN

## 2025-06-12 NOTE — PROGRESS NOTES
Ob Visit  25     SUBJECTIVE      HPI: Dori Marroquin is a 29 y.o.  at 28w3d here for RPNV.  She has *** contractions, *** bleeding, and *** LOF. Reports {NORMAL/ABNORMAL:20274} fetal movement.  Patient reports ***.       OBJECTIVE  Visit Vitals  BP 94/64   Pulse 97   Wt 51.8 kg (114 lb 1.6 oz)   LMP 2024   BMI 22.28 kg/m²   OB Status Pregnant   Smoking Status Never   BSA 1.48 m²            ASSESSMENT & PLAN    Dori Marroquin is a 29 y.o.  at 28w3d here for the following concerns we addressed today:    No problem-specific Assessment & Plan notes found for this encounter.        Orders Placed This Encounter   Procedures    Tdap vaccine, age 7 years and older  (BOOSTRIX)       RTC in *** weeks      Gertrude Delgado MD      Procedures    US MAC OB imaging order    Tdap vaccine, age 7 years and older  (BOOSTRIX)    Referral to Maternal Fetal Medicine       RTC in 2 weeks      Gertrude Delgado MD

## 2025-06-13 ENCOUNTER — LAB (OUTPATIENT)
Dept: LAB | Facility: HOSPITAL | Age: 30
End: 2025-06-13
Payer: COMMERCIAL

## 2025-06-14 LAB
GLUCOSE 1H P 50 G GLC PO SERPL-MCNC: 81 MG/DL
T PALLIDUM AB SER QL IA: NORMAL

## 2025-06-16 ENCOUNTER — TELEPHONE (OUTPATIENT)
Dept: OBSTETRICS AND GYNECOLOGY | Facility: HOSPITAL | Age: 30
End: 2025-06-16

## 2025-06-16 NOTE — ASSESSMENT & PLAN NOTE
- Today we discussed the risk of trial of labor after cornual wedge resection. We discussed that data on rate of uterine  rupture in this patient population is limited, with case reports suggesting a rate of 4-30%. We also discussed multiple cases of spontaneous rupture in the second trimester in the literature, though again this is a small patient population.   - We reviewed the pathophysiology behind uterine scar formation, disruption of the myometrium, and risk of uterine rupture.  - We discussed that uterine rupture is a surgical emergency with substantial risk to both maternal and fetal life.  - Given the limited data, it is generally recommended to plan for pre-labor  delivery at 36-37wga, as would be recommended for history of classical  section.  - We encouraged patient to have a low threshold to present to L&D for evaluation with any pain, ctx or concerns as she is extremely high risk  - Following our discussion, patient and partner more amenable to CS delivery however still hesitant. Discussed that it  is her choice if she chooses to labor, however this would pose substantial risk to fetal and maternal life, and that she would not be offered induction of labor.

## 2025-06-18 DIAGNOSIS — O09.892 SUPERVISION OF OTHER HIGH RISK PREGNANCIES, SECOND TRIMESTER (HHS-HCC): ICD-10-CM

## 2025-06-18 PROBLEM — O09.293: Status: ACTIVE | Noted: 2025-02-04

## 2025-06-18 PROBLEM — Z3A.29 29 WEEKS GESTATION OF PREGNANCY (HHS-HCC): Status: ACTIVE | Noted: 2025-02-04

## 2025-06-18 LAB
GLUCOSE 1H P 50 G GLC PO SERPL-MCNC: 81 MG/DL
T PALLIDUM AB SER QL IA: NEGATIVE

## 2025-06-19 ENCOUNTER — CONSULT (OUTPATIENT)
Dept: MATERNAL FETAL MEDICINE | Facility: CLINIC | Age: 30
End: 2025-06-19
Payer: COMMERCIAL

## 2025-06-19 ENCOUNTER — HOSPITAL ENCOUNTER (OUTPATIENT)
Dept: RADIOLOGY | Facility: CLINIC | Age: 30
Discharge: HOME | End: 2025-06-19
Payer: COMMERCIAL

## 2025-06-19 VITALS
DIASTOLIC BLOOD PRESSURE: 61 MMHG | HEART RATE: 79 BPM | WEIGHT: 115.3 LBS | BODY MASS INDEX: 22.52 KG/M2 | SYSTOLIC BLOOD PRESSURE: 98 MMHG

## 2025-06-19 DIAGNOSIS — Z3A.29 29 WEEKS GESTATION OF PREGNANCY (HHS-HCC): ICD-10-CM

## 2025-06-19 DIAGNOSIS — O34.29 UTERINE SCAR FROM PREVIOUS SURGERY AFFECTING PREGNANCY (HHS-HCC): Primary | ICD-10-CM

## 2025-06-19 DIAGNOSIS — O34.29 UTERINE SCAR FROM PREVIOUS SURGERY AFFECTING PREGNANCY (HHS-HCC): ICD-10-CM

## 2025-06-19 DIAGNOSIS — O09.293: ICD-10-CM

## 2025-06-19 PROCEDURE — 99214 OFFICE O/P EST MOD 30 MIN: CPT | Mod: 25,GC | Performed by: OBSTETRICS & GYNECOLOGY

## 2025-06-19 PROCEDURE — 76816 OB US FOLLOW-UP PER FETUS: CPT

## 2025-06-19 PROCEDURE — 99214 OFFICE O/P EST MOD 30 MIN: CPT | Performed by: OBSTETRICS & GYNECOLOGY

## 2025-06-19 ASSESSMENT — PAIN SCALES - GENERAL: PAINLEVEL_OUTOF10: 0-NO PAIN

## 2025-06-19 ASSESSMENT — PATIENT HEALTH QUESTIONNAIRE - PHQ9
SUM OF ALL RESPONSES TO PHQ9 QUESTIONS 1 AND 2: 0
1. LITTLE INTEREST OR PLEASURE IN DOING THINGS: NOT AT ALL
2. FEELING DOWN, DEPRESSED OR HOPELESS: NOT AT ALL

## 2025-06-19 NOTE — ASSESSMENT & PLAN NOTE
- I reviewed the common etiologies of early onset fetal growth restriction. Maternal causes including pre-existing chronic conditions such as hypertension, uncontrolled diabetes, lupus, thyroid disorders, smoking, and drug use. She does not have any of these risk factors.   - Placental pathology from prior delivery was reviewed, wnl. Suspect prior FGR history due to constitutionally small infant as she herself is petite   - Growth ultrasound today within normal limits but low-normal. Recommend follow  up growth in 3 weeks.

## 2025-06-19 NOTE — ASSESSMENT & PLAN NOTE
- patient and her partner extensively counseled regarding the nature of cornual wedge resection, including that, as opposed to the uterine incision created at the time of her , with a wedge resection, significant uterine tissue is disrupted and removed in order to remove the ectopic pregnancy  - we reviewed that degree of myometrial disruption as well as location at a point of high mechanical stress in labor makes risk of uterine rupture high, and RCS is therefore recommended by 37w0d due to high risk of uterine rupture in the event of labor. Patient counseled that risk of rupture approaches 20%. We reviewed that uterine rupture can be associated with significant morbidity and potential mortality for both Ms. Marroquin and the baby  - patient strongly desired TOLAC after first  and is struggling with the recommendation for repeat  delivery  - in an effort to provide the patient the opportunity to speak with another provider who would be well versed in the literature regarding this recommendation, offered consultation with M to provide another opinion regarding recommendation for route of delivery in the context of her prior surgical history and was interested in referral. Referral order placed today.

## 2025-06-19 NOTE — PROGRESS NOTES
2025   Dori Marroquin     [facility] CONSULT NOTE  Referring Clinician: Gertrude Delgado MD  Reason for consult: h/o cornual ectopic w/ wedge resection    HPI: Dori Marroquin is a 29 y.o.  at 29w3d here for consult for h/o left cornual ectopic s/p wedge resection in 10/2023.     Denies ctx, LOF, VB, or decreased fetal movement.  Growth US today : EFW 22% AC 12%     Patient strongly desires labor and vaginal delivery. Has been counseled previously regarding recommendation for rCS however she strongly desires to labor, seeking second opinion.    Patient sites birth trauma from prior CS as reason for desired . Felt like pitocin and epidural stalled her labor and she received too many anesthesia medications. On chart review, pt underwent IOL for SROM and had failed IOL at 15hrs of pitocin with Cat 2 tracing remote from delivery.    Pregnancy otherwise notable for:   - h/o FGR in prior pregnancy, placental path normal  - h/o CS for failed IOL in s/o SROM   - BMI <18 at NOB  - depression no meds      10 point review of system is negative except as above    OB History          5    Para   1    Term   1            AB   3    Living   1         SAB        IAB   2    Ectopic   1    Multiple        Live Births   1                   Past medical history: Denies HTN, DM, asthma, or thyroid issues    Surgical History[1]    Medications: prenatal vitamins    RX Allergies[2]    Social History[3]    family history includes Diabetes in her maternal grandfather and paternal grandmother; Hypertension in her father and mother.      OBJECTIVE  Visit Vitals  BP 98/61   Pulse 79   Wt 52.3 kg (115 lb 4.8 oz)   LMP 2024   BMI 22.52 kg/m²   OB Status Pregnant   Smoking Status Never   BSA 1.49 m²       Physical exam  FHT: US    ASSESSMENT & PLAN    Dori Marroquin is a 29 y.o.  at 29w3d here for the following:    Assessment & Plan  Uterine scar from previous surgery affecting pregnancy ([facility])  - Today we  discussed the risk of trial of labor after cornual wedge resection. We discussed that data on rate of uterine  rupture in this patient population is limited, with case reports suggesting a rate of 4-30%. We also discussed multiple cases of spontaneous rupture in the second trimester in the literature, though again this is a small patient population.   - We reviewed the pathophysiology behind uterine scar formation, disruption of the myometrium, and risk of uterine rupture.  - We discussed that uterine rupture is a surgical emergency with substantial risk to both maternal and fetal life.  - Given the limited data, it is generally recommended to plan for pre-labor  delivery at 36-37wga, as would be recommended for history of classical  section.  - We encouraged patient to have a low threshold to present to L&D for evaluation with any pain, ctx or concerns as she is extremely high risk  - Following our discussion, patient and partner more amenable to CS delivery however still hesitant. Discussed that it  is her choice if she chooses to labor, however this would pose substantial risk to fetal and maternal life, and that she would not be offered induction of labor.  Hx of intrauterine growth restriction in prior pregnancy, currently pregnant, third trimester ([facility])  - I reviewed the common etiologies of early onset fetal growth restriction. Maternal causes including pre-existing chronic conditions such as hypertension, uncontrolled diabetes, lupus, thyroid disorders, smoking, and drug use. She does not have any of these risk factors.   - Placental pathology from prior delivery was reviewed, wnl. Suspect prior FGR history due to constitutionally small infant as she herself is petite   - Growth ultrasound today within normal limits but low-normal. Recommend follow  up growth in 3 weeks.    29 weeks gestation of pregnancy ([facility])  - UTD on PNC  - We reviewed her labor course and reason for CS. Will  reach out to OB anesthesia for possible pre-delivery consult to review CS medication options.     Thank you for allowing us to participate in the care of your patient. We anticipate she should be able to continue her care under your supervision. Please do not hesitate to contact us should any concerns arise.    Seen and discussed with Dr. Junior Perkins MD , PGY-3  Maternal Fetal Medicine         [1]   Past Surgical History:  Procedure Laterality Date     SECTION, LOW TRANSVERSE  2022    ECTOPIC PREGNANCY SURGERY     [2]   Allergies  Allergen Reactions    Pineapple Hives, Itching, Other, Shortness of breath and Wheezing    Gel Rash     Ultrasound gel   [3]   Social History  Tobacco Use    Smoking status: Never    Smokeless tobacco: Never   Vaping Use    Vaping status: Never Used   Substance Use Topics    Alcohol use: Never    Drug use: Never

## 2025-06-19 NOTE — ASSESSMENT & PLAN NOTE
- UTD on PNC  - We reviewed her labor course and reason for CS. Will reach out to OB anesthesia for possible pre-delivery consult to review CS medication options.

## 2025-06-19 NOTE — ASSESSMENT & PLAN NOTE
- I reviewed the common etiologies of early onset fetal growth restriction. Maternal causes including pre-existing chronic conditions such as hypertension, uncontrolled diabetes, lupus, thyroid disorders, smoking, and drug use. She does not have any of these risk factors.   - Placental pathology from prior delivery was reviewed, wnl. Suspect prior FGR history due to constitutionally small infant as she herself is petit.  - Growth ultrasound today within normal limits but low-normal. Recommend follow  up growth in 3 weeks.

## 2025-07-02 PROBLEM — O34.29 UTERINE SCAR FROM PREVIOUS SURGERY AFFECTING PREGNANCY (HHS-HCC): Status: RESOLVED | Noted: 2025-02-04 | Resolved: 2025-07-02

## 2025-07-02 PROBLEM — Z3A.31 31 WEEKS GESTATION OF PREGNANCY (HHS-HCC): Status: ACTIVE | Noted: 2025-02-04

## 2025-07-02 NOTE — ASSESSMENT & PLAN NOTE
Trial of labor is NOT recommended due to history of left cornual wedge resection for left cornual ectopic pregnancy   RCS recommended by 37w0d due to high risk of uterine rupture in the event of labor

## 2025-07-02 NOTE — PROGRESS NOTES
[facility] Follow-up  [unfilled]         SUBJECTIVE    HPI: Dori Marroquin is a 29 y.o.  at 31w2d here for RPNV. Denies ***contractions, ***bleeding, or ***LOF. Reports ***normal fetal movement. Patient reports ***    Problem List Items Addressed This Visit       RESOLVED: Uterine scar from previous surgery affecting pregnancy ([facility])     Trial of labor is NOT recommended due to history of left cornual wedge resection for left cornual ectopic pregnancy   RCS recommended by 37w0d due to high risk of uterine rupture in the event of labor         Hx of intrauterine growth restriction in prior pregnancy, currently pregnant, third trimester ([facility])    Growth  today         31 weeks gestation of pregnancy ([facility]) - Primary    TDAP offered             OBJECTIVE  Visit Vitals  LMP 2024   OB Status Pregnant   Smoking Status Never      FHT: ***    ASSESSMENT & PLAN    Dori Marroquin is a 29 y.o.  at 31w2d here for the following concerns we addressed today:    Uterine scar from previous surgery affecting pregnancy ([facility])   Trial of labor is NOT recommended due to history of left cornual wedge resection for left cornual ectopic pregnancy   RCS recommended by 37w0d due to high risk of uterine rupture in the event of labor    31 weeks gestation of pregnancy ([facility])  TDAP offered    Hx of intrauterine growth restriction in prior pregnancy, currently pregnant, third trimester ([facility])  Growth US today     No orders of the defined types were placed in this encounter.       RTC in *** weeks    {Patient seen and evaluated with  ***}    Sarah Weinstein MD

## 2025-07-03 ENCOUNTER — APPOINTMENT (OUTPATIENT)
Dept: MATERNAL FETAL MEDICINE | Facility: CLINIC | Age: 30
End: 2025-07-03
Payer: COMMERCIAL

## 2025-07-03 ENCOUNTER — APPOINTMENT (OUTPATIENT)
Dept: RADIOLOGY | Facility: CLINIC | Age: 30
End: 2025-07-03
Payer: COMMERCIAL

## 2025-07-03 DIAGNOSIS — O09.293: ICD-10-CM

## 2025-07-03 DIAGNOSIS — O34.29 UTERINE SCAR FROM PREVIOUS SURGERY AFFECTING PREGNANCY (HHS-HCC): ICD-10-CM

## 2025-07-03 DIAGNOSIS — Z3A.31 31 WEEKS GESTATION OF PREGNANCY (HHS-HCC): Primary | ICD-10-CM

## 2025-07-08 ENCOUNTER — APPOINTMENT (OUTPATIENT)
Dept: OBSTETRICS AND GYNECOLOGY | Facility: HOSPITAL | Age: 30
End: 2025-07-08
Payer: MEDICAID

## 2025-07-15 ENCOUNTER — APPOINTMENT (OUTPATIENT)
Dept: OBSTETRICS AND GYNECOLOGY | Facility: HOSPITAL | Age: 30
End: 2025-07-15
Payer: COMMERCIAL

## 2025-07-17 ENCOUNTER — HOSPITAL ENCOUNTER (OUTPATIENT)
Dept: RADIOLOGY | Facility: HOSPITAL | Age: 30
Discharge: HOME | End: 2025-07-17
Payer: COMMERCIAL

## 2025-07-17 DIAGNOSIS — O34.29 UTERINE SCAR FROM PREVIOUS SURGERY AFFECTING PREGNANCY (HHS-HCC): ICD-10-CM

## 2025-07-17 PROCEDURE — 76816 OB US FOLLOW-UP PER FETUS: CPT

## 2025-07-18 ENCOUNTER — ROUTINE PRENATAL (OUTPATIENT)
Dept: OBSTETRICS AND GYNECOLOGY | Facility: HOSPITAL | Age: 30
End: 2025-07-18
Payer: COMMERCIAL

## 2025-07-18 VITALS — BODY MASS INDEX: 23.05 KG/M2 | DIASTOLIC BLOOD PRESSURE: 63 MMHG | WEIGHT: 118 LBS | SYSTOLIC BLOOD PRESSURE: 98 MMHG

## 2025-07-18 DIAGNOSIS — O09.11: ICD-10-CM

## 2025-07-18 DIAGNOSIS — O09.293: ICD-10-CM

## 2025-07-18 DIAGNOSIS — Z3A.33 33 WEEKS GESTATION OF PREGNANCY (HHS-HCC): Primary | ICD-10-CM

## 2025-07-18 PROCEDURE — 0501F PRENATAL FLOW SHEET: CPT | Performed by: OBSTETRICS & GYNECOLOGY

## 2025-07-18 ASSESSMENT — SOCIAL DETERMINANTS OF HEALTH (SDOH)
WITHIN THE LAST YEAR, HAVE TO BEEN RAPED OR FORCED TO HAVE ANY KIND OF SEXUAL ACTIVITY BY YOUR PARTNER OR EX-PARTNER?: NO
WITHIN THE LAST YEAR, HAVE YOU BEEN KICKED, HIT, SLAPPED, OR OTHERWISE PHYSICALLY HURT BY YOUR PARTNER OR EX-PARTNER?: NO
WITHIN THE LAST YEAR, HAVE YOU BEEN AFRAID OF YOUR PARTNER OR EX-PARTNER?: NO
WITHIN THE LAST YEAR, HAVE YOU BEEN HUMILIATED OR EMOTIONALLY ABUSED IN OTHER WAYS BY YOUR PARTNER OR EX-PARTNER?: NO

## 2025-07-18 ASSESSMENT — PATIENT HEALTH QUESTIONNAIRE - PHQ9
2. FEELING DOWN, DEPRESSED OR HOPELESS: NOT AT ALL
SUM OF ALL RESPONSES TO PHQ9 QUESTIONS 1 & 2: 0
1. LITTLE INTEREST OR PLEASURE IN DOING THINGS: NOT AT ALL

## 2025-07-18 NOTE — PROGRESS NOTES
SUBJECTIVE  Dori Marroquin is a 29 y.o.  at 33w4d with an estimated date of delivery of 2025, by Last Menstrual Period who presents for a routine prenatal visit.    She denies loss of fluid, vaginal bleeding, regular contractions/cramping, and decreased fetal movement.    OBJECTIVE  Weight: 53.5 kg (118 lb)   Pregravid BMI: 18.16  BP: 98/63  Fetal Heart Rate: 134      ASSESSMENT & PLAN    Pregnancy with history of ectopic pregnancy, antepartum, first trimester ([facility])  - Reviewed recommendation for CS at 37 weeks  - Patient is not comfortable with this plan and is concerned about early term delivery  - Reviewed risks of uterine rupture  - At this time she is comfortable scheduling a CS at 38 weeks. Will request. Reviewed this is not our recommendation  - Strict precautions given to presenting to L&D    Hx of intrauterine growth restriction in prior pregnancy, currently pregnant, third trimester ([facility])  - Normal growth yesterday    33 weeks gestation of pregnancy ([facility])  - Up to date  - [facility] next visit    Follow up in 2 weeks for return OB visit.    Sabrina Cooney MD  Obstetrics & Gynecology  25

## 2025-07-18 NOTE — ASSESSMENT & PLAN NOTE
- Reviewed recommendation for CS at 37 weeks  - Patient is not comfortable with this plan and is concerned about early term delivery  - Reviewed risks of uterine rupture  - At this time she is comfortable scheduling a CS at 38 weeks. Will request. Reviewed this is not our recommendation  - Strict precautions given to presenting to L&D

## 2025-07-22 ENCOUNTER — TELEPHONE (OUTPATIENT)
Dept: OBSTETRICS AND GYNECOLOGY | Facility: HOSPITAL | Age: 30
End: 2025-07-22
Payer: COMMERCIAL

## 2025-07-22 NOTE — TELEPHONE ENCOUNTER
RN called patient to discuss scheduled .   Pt name and  verified.   The following details were discussed:  Date and time of  birth:  at 10:30 a.m.  Location: [address]  Arrive at the hospital by: 8:30 a.m.    Before Surgery:   Complete pre-surgery lab work at a  lab within three days before your surgery, at any     Continue taking all medications as instructed by your provider.   Bathe or shower with the antimicrobial soap the night before or day of your surgery.   Remove make-up, nail polish or any jewelry (including rings and body piercings).   Do not eat anything eight hours before your surgery start time (no gum, hard candy, etc.).   You can drink clear liquids up to two hours before your surgery. Examples: Water, plain tea or coffee (no milk, cream or sugar), Gatorade, Powerade      2 week incision check: 25  6 week PPV: 10/2/25    Information sent in letter via [employer]  Pt verbalized understanding  All questions and concerns addressed  ATIF Rosenthal

## 2025-07-22 NOTE — TELEPHONE ENCOUNTER
Called patient to go over  and received voicemail. Let message to call office back at [phone]. Will call again later.    Ariadna SRIVASTAVA

## 2025-07-25 ENCOUNTER — APPOINTMENT (OUTPATIENT)
Dept: OBSTETRICS AND GYNECOLOGY | Facility: HOSPITAL | Age: 30
End: 2025-07-25
Payer: COMMERCIAL

## 2025-08-07 ENCOUNTER — HOSPITAL ENCOUNTER (OUTPATIENT)
Dept: RADIOLOGY | Facility: HOSPITAL | Age: 30
Discharge: HOME | End: 2025-08-07
Payer: COMMERCIAL

## 2025-08-07 DIAGNOSIS — O34.29 UTERINE SCAR FROM PREVIOUS SURGERY AFFECTING PREGNANCY (HHS-HCC): ICD-10-CM

## 2025-08-07 DIAGNOSIS — Z03.74 SUSPECTED PROBLEM WITH FETAL GROWTH NOT FOUND: ICD-10-CM

## 2025-08-07 PROCEDURE — 76816 OB US FOLLOW-UP PER FETUS: CPT | Performed by: MEDICAL GENETICS

## 2025-08-07 PROCEDURE — 76816 OB US FOLLOW-UP PER FETUS: CPT

## 2025-08-08 ENCOUNTER — PHARMACY VISIT (OUTPATIENT)
Dept: PHARMACY | Facility: CLINIC | Age: 30
End: 2025-08-08
Payer: MEDICARE

## 2025-08-08 ENCOUNTER — ROUTINE PRENATAL (OUTPATIENT)
Dept: OBSTETRICS AND GYNECOLOGY | Facility: HOSPITAL | Age: 30
End: 2025-08-08
Payer: COMMERCIAL

## 2025-08-08 ENCOUNTER — TELEPHONE (OUTPATIENT)
Dept: OBSTETRICS AND GYNECOLOGY | Facility: HOSPITAL | Age: 30
End: 2025-08-08

## 2025-08-08 VITALS — WEIGHT: 120 LBS | SYSTOLIC BLOOD PRESSURE: 99 MMHG | DIASTOLIC BLOOD PRESSURE: 63 MMHG | BODY MASS INDEX: 23.44 KG/M2

## 2025-08-08 DIAGNOSIS — O26.10: ICD-10-CM

## 2025-08-08 DIAGNOSIS — O09.11: Primary | ICD-10-CM

## 2025-08-08 DIAGNOSIS — Z3A.36 36 WEEKS GESTATION OF PREGNANCY (HHS-HCC): ICD-10-CM

## 2025-08-08 DIAGNOSIS — Z33.1 PREGNANT STATE, INCIDENTAL (HHS-HCC): ICD-10-CM

## 2025-08-08 DIAGNOSIS — O09.293: ICD-10-CM

## 2025-08-08 DIAGNOSIS — O99.011 ANEMIA DURING PREGNANCY IN FIRST TRIMESTER: ICD-10-CM

## 2025-08-08 DIAGNOSIS — O34.29 UTERINE SCAR FROM PREVIOUS SURGERY AFFECTING PREGNANCY (HHS-HCC): ICD-10-CM

## 2025-08-08 PROCEDURE — RXMED WILLOW AMBULATORY MEDICATION CHARGE

## 2025-08-08 PROCEDURE — 0501F PRENATAL FLOW SHEET: CPT | Performed by: OBSTETRICS & GYNECOLOGY

## 2025-08-08 PROCEDURE — 99212 OFFICE O/P EST SF 10 MIN: CPT

## 2025-08-08 PROCEDURE — 99213 OFFICE O/P EST LOW 20 MIN: CPT

## 2025-08-08 RX ORDER — FERROUS SULFATE 325(65) MG
1 TABLET ORAL
Qty: 90 TABLET | Refills: 3 | Status: SHIPPED | OUTPATIENT
Start: 2025-08-08 | End: 2026-08-08

## 2025-08-08 RX ORDER — BREAST PUMP
1 EACH MISCELLANEOUS DAILY PRN
Qty: 1 EACH | Refills: 0 | Status: SHIPPED | OUTPATIENT
Start: 2025-08-08

## 2025-08-08 RX ORDER — BREAST PUMP
1 EACH MISCELLANEOUS DAILY PRN
Qty: 1 EACH | Refills: 0 | Status: SHIPPED | OUTPATIENT
Start: 2025-08-08 | End: 2025-08-08

## 2025-08-08 ASSESSMENT — LIFESTYLE VARIABLES
HOW OFTEN DO YOU HAVE A DRINK CONTAINING ALCOHOL: NEVER
HOW MANY STANDARD DRINKS CONTAINING ALCOHOL DO YOU HAVE ON A TYPICAL DAY: PATIENT DOES NOT DRINK
HOW OFTEN DO YOU HAVE SIX OR MORE DRINKS ON ONE OCCASION: NEVER
AUDIT-C TOTAL SCORE: 0
SKIP TO QUESTIONS 9-10: 1

## 2025-08-08 NOTE — TELEPHONE ENCOUNTER
----- Message from Yumiko Lewis sent at 2025 11:52 AM EDT -----  Regarding: change of date for   Can you please let the patient know:  Ms Marroquin is now scheduled on Monday,  with an arrival time of 0630 start time of 830.   She should get her blood drawn  or .  Thank you.

## 2025-08-08 NOTE — PROGRESS NOTES
Ob Follow-up  25   SUBJECTIVE  HPI: Dori Marroquin is a 29 y.o.  at 36w4d here for RPNV.  She has no contractions, no bleeding, or LOF. Reports normal fetal movement. Patient reports wanting to change the date of her surgery because she does not want to have the surgery on that day. She would like to delay the surgery because she is concerned about the maturity of the baby.     OBJECTIVE  Visit Vitals  BP 99/63   Wt 54.4 kg (120 lb)   LMP 2024   BMI 23.44 kg/m²   OB Status Pregnant   Smoking Status Never   BSA 1.52 m²      ASSESSMENT & PLAN    Dori Marroquin is a 29 y.o.  at 36w4d here for the following concerns we addressed today:    Problem List Items Addressed This Visit          Ob-Gyn Problems    36 weeks gestation of pregnancy ([facility])    Overview   Desired provider in labor: [] CNM  [x] Physician -- pre-labor c/s required [] Either Acceptable  [x] Blood Products: [x] Yes, accepts [] No, needs counseling  [x] Initial BMI: 18.16   [x] Prenatal Labs  [x] Cervical Cancer Screening up to date declined at [facility], need pp  [x] Rh status: positive  [x] Screen for IPV and Substance Use Risk:  [x] Genetic Screening (cfDNA): ordered 25  [x] First Trimester Anatomy Screen (11-13.6 wks): completed 25  [] Baby ASA (initiated):   [x] Pregnancy dated by: LMP c/w 7 wk US    [x] Anatomy US: (19-20 wks)  [] Federal Sterilization consent signed (if indicated):  [x] 1hr GCT at 24-28wks:  [x] Rhogam (if indicated): NA  [] Fetal Surveillance (if indicated):  [x] Tdap (27-32 wks, may be given up to 36 wks if initial window missed):     [x] Feeding Intentions: breast feeding, needs pump  [x] Postpartum Birth control method: declines but may consider POPs  [x] GBS at 36 - 37 wks: done   [] 39 weeks discussion of IOL vs. Expectant management:  [x] Mode of delivery ( anticipated ): needs repeat c/s at 37 weeks for h/o cornual wedge resection for ectopic.  Declines at 37, aware of risk of  uterine rupture and possible catastrophic effect on baby, including death and bleeding for patient.         Hx of intrauterine growth restriction in prior pregnancy, currently pregnant, third trimester ([facility])    Overview   Growth US  EFW 22% AC 12%    -Appropriate fetal growth: 2667 g at the 27%          Low weight gain in pregnancy, unspecified trimester ([facility])    Overview   Poor weight gain of pregnancy- has gained 27# so far         Pregnancy with history of ectopic pregnancy, antepartum, first trimester ([facility]) - Primary    Overview   - LEFT CORNUAL ECTOPIC  - s/p left cornual wedge resection and salpingectomy 10/19/2023  - labor not recommended    Underwent wedge resection of cornual ectopic, left salpingectomy on 10/19/23. Ultrasound: 10/19/23:   Live ectopic pregnancy within the myometrium of the uterine fundus,  outside of the endometrium, corresponding to a gestational age of 6  weeks, 5 days.          Relevant Orders    Group B Streptococcus (GBS) Prenatal Screen, Culture    Referral to Anesthesiology    RESOLVED: Uterine scar from previous surgery affecting pregnancy ([facility])    Overview   - Trial of labor is NOT recommended due to history of left cornual wedge resection for left cornual ectopic pregnancy  - RCS recommended by 37w0d due to high risk of uterine rupture in the event of labor. Patient counseled that risk of rupture approaches 20%  - patient strongly desired TOLAC after first  and is struggling with the recommendation for repeat  delivery  - patient offered consultation with M to provide another opinion regarding recommendation for route of delivery in the context of her prior surgical history and was interested in referral.     *s/p MFM consult  - reiterated recommendation for pre-labor CS 36-37wga. Adrianna contacted for pre-delivery consult given pt anxiety about anesthesia medications and h/o birth trauma from prior CS          Other Visit Diagnoses          Anemia during pregnancy in first trimester        Relevant Medications    ferrous sulfate 325 mg (65 mg elemental) tablet      Pregnant state, incidental ([facility])        Relevant Medications    breast pump device              Orders Placed This Encounter   Procedures    Group B Streptococcus (GBS) Prenatal Screen, Culture     Does the patient have a Penicillin allergy?:   No     SOURCE::   Vaginal/Rectal     Release result to MyChart:   Immediate [1]    Referral to Anesthesiology     Standing Status:   Future     Expected Date:   8/8/2025     Expiration Date:   8/8/2026     Referral Priority:   Routine     Referral Type:   Consultation     Referral Reason:   Specialty Services Required     Requested Specialty:   Anesthesiology     Number of Visits Requested:   1      RTC in 1 weeks  Yumiko Lewis MD

## 2025-08-08 NOTE — TELEPHONE ENCOUNTER
Called patient and verified by name and . Told patient date of new . Also went over when to get labs done and the cut off for eating. Patient verbalized understanding and had no further questions or concerns.    Ariadna SRIVASTAVA

## 2025-08-11 PROBLEM — Z3A.37 37 WEEKS GESTATION OF PREGNANCY (HHS-HCC): Status: ACTIVE | Noted: 2025-02-04

## 2025-08-11 LAB — GP B STREP SPEC QL CULT: NORMAL

## 2025-08-19 ENCOUNTER — ROUTINE PRENATAL (OUTPATIENT)
Dept: OBSTETRICS AND GYNECOLOGY | Facility: HOSPITAL | Age: 30
End: 2025-08-19
Payer: COMMERCIAL

## 2025-08-19 VITALS — WEIGHT: 121 LBS | DIASTOLIC BLOOD PRESSURE: 70 MMHG | SYSTOLIC BLOOD PRESSURE: 103 MMHG | BODY MASS INDEX: 23.63 KG/M2

## 2025-08-19 DIAGNOSIS — Z3A.38 38 WEEKS GESTATION OF PREGNANCY (HHS-HCC): ICD-10-CM

## 2025-08-19 DIAGNOSIS — O34.29 UTERINE SCAR FROM PREVIOUS SURGERY AFFECTING PREGNANCY (HHS-HCC): Primary | ICD-10-CM

## 2025-08-19 PROCEDURE — 0501F PRENATAL FLOW SHEET: CPT | Performed by: STUDENT IN AN ORGANIZED HEALTH CARE EDUCATION/TRAINING PROGRAM

## 2025-08-19 PROCEDURE — 99212 OFFICE O/P EST SF 10 MIN: CPT

## 2025-08-20 ENCOUNTER — TELEPHONE (OUTPATIENT)
Dept: OBSTETRICS AND GYNECOLOGY | Facility: CLINIC | Age: 30
End: 2025-08-20
Payer: COMMERCIAL

## 2025-08-22 ENCOUNTER — LAB (OUTPATIENT)
Dept: LAB | Facility: HOSPITAL | Age: 30
End: 2025-08-22
Payer: COMMERCIAL

## 2025-08-22 ENCOUNTER — ANESTHESIA EVENT (OUTPATIENT)
Dept: OBSTETRICS AND GYNECOLOGY | Facility: HOSPITAL | Age: 30
End: 2025-08-22
Payer: COMMERCIAL

## 2025-08-22 ENCOUNTER — LAB REQUISITION (OUTPATIENT)
Dept: LAB | Facility: HOSPITAL | Age: 30
End: 2025-08-22
Payer: COMMERCIAL

## 2025-08-22 LAB
ABO GROUP (TYPE) IN BLOOD: NORMAL
ANTIBODY SCREEN: NORMAL
ERYTHROCYTE [DISTWIDTH] IN BLOOD BY AUTOMATED COUNT: 13 % (ref 11.5–14.5)
HCT VFR BLD AUTO: 28.4 % (ref 36–46)
HGB BLD-MCNC: 9.5 G/DL (ref 12–16)
MCH RBC QN AUTO: 31.6 PG (ref 26–34)
MCHC RBC AUTO-ENTMCNC: 33.5 G/DL (ref 32–36)
MCV RBC AUTO: 94 FL (ref 80–100)
NRBC BLD-RTO: 0 /100 WBCS (ref 0–0)
PLATELET # BLD AUTO: 266 X10*3/UL (ref 150–450)
RBC # BLD AUTO: 3.01 X10*6/UL (ref 4–5.2)
RH FACTOR (ANTIGEN D): NORMAL
WBC # BLD AUTO: 9.1 X10*3/UL (ref 4.4–11.3)

## 2025-08-22 PROCEDURE — 36415 COLL VENOUS BLD VENIPUNCTURE: CPT

## 2025-08-22 PROCEDURE — 86850 RBC ANTIBODY SCREEN: CPT

## 2025-08-22 PROCEDURE — 85027 COMPLETE CBC AUTOMATED: CPT

## 2025-08-22 PROCEDURE — 86900 BLOOD TYPING SEROLOGIC ABO: CPT

## 2025-08-22 PROCEDURE — 86901 BLOOD TYPING SEROLOGIC RH(D): CPT

## 2025-08-23 ENCOUNTER — HOSPITAL ENCOUNTER (OUTPATIENT)
Facility: HOSPITAL | Age: 30
Setting detail: SURGERY ADMIT
Discharge: HOME | End: 2025-08-23
Attending: STUDENT IN AN ORGANIZED HEALTH CARE EDUCATION/TRAINING PROGRAM | Admitting: STUDENT IN AN ORGANIZED HEALTH CARE EDUCATION/TRAINING PROGRAM
Payer: COMMERCIAL

## 2025-08-23 VITALS
BODY MASS INDEX: 23.94 KG/M2 | SYSTOLIC BLOOD PRESSURE: 107 MMHG | OXYGEN SATURATION: 100 % | DIASTOLIC BLOOD PRESSURE: 68 MMHG | HEART RATE: 70 BPM | HEIGHT: 60 IN | RESPIRATION RATE: 16 BRPM | WEIGHT: 121.91 LBS | TEMPERATURE: 97.5 F

## 2025-08-23 LAB
BILIRUBIN, POC: NEGATIVE
BLOOD URINE, POC: POSITIVE
CLARITY, POC: CLEAR
COLOR, POC: YELLOW
GLUCOSE URINE, POC: NEGATIVE
KETONES, POC: POSITIVE
LEUKOCYTE EST, POC: NORMAL
NITRITE, POC: NEGATIVE
PH, POC: 7
POC APPEARANCE OF BODY FLUID: NORMAL
SPECIFIC GRAVITY, POC: 1.02
URINE PROTEIN, POC: NORMAL
UROBILINOGEN, POC: 0.2

## 2025-08-23 PROCEDURE — 59025 FETAL NON-STRESS TEST: CPT | Mod: GC

## 2025-08-23 PROCEDURE — 87086 URINE CULTURE/COLONY COUNT: CPT

## 2025-08-23 PROCEDURE — 99213 OFFICE O/P EST LOW 20 MIN: CPT

## 2025-08-23 PROCEDURE — 59025 FETAL NON-STRESS TEST: CPT

## 2025-08-23 PROCEDURE — 99214 OFFICE O/P EST MOD 30 MIN: CPT | Mod: 25

## 2025-08-23 SDOH — HEALTH STABILITY: MENTAL HEALTH: HAVE YOU USED ANY PRESCRIPTION DRUGS OTHER THAN PRESCRIBED IN THE PAST 12 MONTHS?: NO

## 2025-08-23 SDOH — SOCIAL STABILITY: SOCIAL INSECURITY: HAS ANYONE EVER THREATENED TO HURT YOUR FAMILY OR YOUR PETS?: NO

## 2025-08-23 SDOH — SOCIAL STABILITY: SOCIAL INSECURITY: DO YOU FEEL ANYONE HAS EXPLOITED OR TAKEN ADVANTAGE OF YOU FINANCIALLY OR OF YOUR PERSONAL PROPERTY?: NO

## 2025-08-23 SDOH — HEALTH STABILITY: MENTAL HEALTH: HAVE YOU USED ANY SUBSTANCES (CANABIS, COCAINE, HEROIN, HALLUCINOGENS, INHALANTS, ETC.) IN THE PAST 12 MONTHS?: NO

## 2025-08-23 SDOH — HEALTH STABILITY: MENTAL HEALTH: SUICIDAL BEHAVIOR (LIFETIME): NO

## 2025-08-23 SDOH — ECONOMIC STABILITY: HOUSING INSECURITY: DO YOU FEEL UNSAFE GOING BACK TO THE PLACE WHERE YOU ARE LIVING?: NO

## 2025-08-23 SDOH — HEALTH STABILITY: MENTAL HEALTH: WISH TO BE DEAD (PAST 1 MONTH): NO

## 2025-08-23 SDOH — HEALTH STABILITY: MENTAL HEALTH: NON-SPECIFIC ACTIVE SUICIDAL THOUGHTS (PAST 1 MONTH): NO

## 2025-08-23 SDOH — SOCIAL STABILITY: SOCIAL INSECURITY: ARE YOU OR HAVE YOU BEEN THREATENED OR ABUSED PHYSICALLY, EMOTIONALLY, OR SEXUALLY BY ANYONE?: NO

## 2025-08-23 SDOH — SOCIAL STABILITY: SOCIAL INSECURITY: HAVE YOU HAD THOUGHTS OF HARMING ANYONE ELSE?: NO

## 2025-08-23 SDOH — SOCIAL STABILITY: SOCIAL INSECURITY: ABUSE SCREEN: ADULT

## 2025-08-23 SDOH — SOCIAL STABILITY: SOCIAL INSECURITY: ARE THERE ANY APPARENT SIGNS OF INJURIES/BEHAVIORS THAT COULD BE RELATED TO ABUSE/NEGLECT?: NO

## 2025-08-23 SDOH — SOCIAL STABILITY: SOCIAL INSECURITY: HAVE YOU HAD ANY THOUGHTS OF HARMING ANYONE ELSE?: NO

## 2025-08-23 SDOH — SOCIAL STABILITY: SOCIAL INSECURITY: DOES ANYONE TRY TO KEEP YOU FROM HAVING/CONTACTING OTHER FRIENDS OR DOING THINGS OUTSIDE YOUR HOME?: NO

## 2025-08-23 SDOH — HEALTH STABILITY: MENTAL HEALTH: WERE YOU ABLE TO COMPLETE ALL THE BEHAVIORAL HEALTH SCREENINGS?: YES

## 2025-08-23 SDOH — SOCIAL STABILITY: SOCIAL INSECURITY: PHYSICAL ABUSE: DENIES

## 2025-08-23 ASSESSMENT — LIFESTYLE VARIABLES
AUDIT-C TOTAL SCORE: 0
HOW OFTEN DO YOU HAVE A DRINK CONTAINING ALCOHOL: NEVER
SKIP TO QUESTIONS 9-10: 1
AUDIT-C TOTAL SCORE: 0
HOW OFTEN DO YOU HAVE 6 OR MORE DRINKS ON ONE OCCASION: NEVER
HOW MANY STANDARD DRINKS CONTAINING ALCOHOL DO YOU HAVE ON A TYPICAL DAY: PATIENT DOES NOT DRINK

## 2025-08-23 ASSESSMENT — ACTIVITIES OF DAILY LIVING (ADL): LACK_OF_TRANSPORTATION: NO

## 2025-08-23 ASSESSMENT — PAIN SCALES - GENERAL
PAINLEVEL_OUTOF10: 0 - NO PAIN
PAINLEVEL_OUTOF10: 0 - NO PAIN

## 2025-08-24 LAB — BACTERIA UR CULT: NO GROWTH

## 2025-08-25 ENCOUNTER — HOSPITAL ENCOUNTER (INPATIENT)
Facility: HOSPITAL | Age: 30
LOS: 3 days | Discharge: HOME | End: 2025-08-28
Attending: SPECIALIST | Admitting: SPECIALIST
Payer: COMMERCIAL

## 2025-08-25 ENCOUNTER — ANESTHESIA (OUTPATIENT)
Dept: OBSTETRICS AND GYNECOLOGY | Facility: HOSPITAL | Age: 30
End: 2025-08-25
Payer: COMMERCIAL

## 2025-08-25 PROCEDURE — 2500000004 HC RX 250 GENERAL PHARMACY W/ HCPCS (ALT 636 FOR OP/ED)

## 2025-08-25 PROCEDURE — 01961 ANES CESAREAN DELIVERY ONLY: CPT | Performed by: ANESTHESIOLOGY

## 2025-08-25 RX ORDER — FAMOTIDINE 10 MG/ML
INJECTION INTRAVENOUS AS NEEDED
Status: DISCONTINUED | OUTPATIENT
Start: 2025-08-25 | End: 2025-08-25

## 2025-08-25 RX ORDER — FENTANYL CITRATE 50 UG/ML
INJECTION, SOLUTION INTRAMUSCULAR; INTRAVENOUS AS NEEDED
Status: DISCONTINUED | OUTPATIENT
Start: 2025-08-25 | End: 2025-08-25

## 2025-08-25 RX ORDER — PHENYLEPHRINE 10 MG/250 ML(40 MCG/ML)IN 0.9 % SOD.CHLORIDE INTRAVENOUS
CONTINUOUS PRN
Status: DISCONTINUED | OUTPATIENT
Start: 2025-08-25 | End: 2025-08-25

## 2025-08-25 RX ORDER — METOCLOPRAMIDE HYDROCHLORIDE 5 MG/ML
INJECTION INTRAMUSCULAR; INTRAVENOUS AS NEEDED
Status: DISCONTINUED | OUTPATIENT
Start: 2025-08-25 | End: 2025-08-25

## 2025-08-25 RX ORDER — BUPIVACAINE HYDROCHLORIDE 7.5 MG/ML
INJECTION, SOLUTION EPIDURAL; RETROBULBAR AS NEEDED
Status: DISCONTINUED | OUTPATIENT
Start: 2025-08-25 | End: 2025-08-25

## 2025-08-25 RX ORDER — CEFAZOLIN 1 G/1
INJECTION, POWDER, FOR SOLUTION INTRAVENOUS AS NEEDED
Status: DISCONTINUED | OUTPATIENT
Start: 2025-08-25 | End: 2025-08-25

## 2025-08-25 RX ORDER — MORPHINE SULFATE 0.5 MG/ML
INJECTION, SOLUTION EPIDURAL; INTRATHECAL; INTRAVENOUS CONTINUOUS PRN
Status: DISCONTINUED | OUTPATIENT
Start: 2025-08-25 | End: 2025-08-25

## 2025-08-25 RX ORDER — KETOROLAC TROMETHAMINE 30 MG/ML
INJECTION, SOLUTION INTRAMUSCULAR; INTRAVENOUS AS NEEDED
Status: DISCONTINUED | OUTPATIENT
Start: 2025-08-25 | End: 2025-08-25

## 2025-08-25 RX ADMIN — PHENYLEPHRINE-NACL IV SOLUTION 10 MG/250ML-0.9% 0.2 MCG/KG/MIN: 10-0.9/25 SOLUTION at 14:43

## 2025-08-25 RX ADMIN — FENTANYL CITRATE 15 MCG: 50 INJECTION, SOLUTION INTRAMUSCULAR; INTRAVENOUS at 14:41

## 2025-08-25 RX ADMIN — OXYTOCIN 600 MILLI-UNITS/MIN: 10 INJECTION, SOLUTION INTRAMUSCULAR; INTRAVENOUS at 15:18

## 2025-08-25 RX ADMIN — FENTANYL CITRATE 50 MCG: 50 INJECTION, SOLUTION INTRAMUSCULAR; INTRAVENOUS at 15:45

## 2025-08-25 RX ADMIN — MORPHINE SULFATE 3 MG: 0.5 INJECTION EPIDURAL; INTRATHECAL; INTRAVENOUS at 15:21

## 2025-08-25 RX ADMIN — ONDANSETRON 4 MG: 2 INJECTION, SOLUTION INTRAMUSCULAR; INTRAVENOUS at 14:12

## 2025-08-25 RX ADMIN — SODIUM CHLORIDE, POTASSIUM CHLORIDE, SODIUM LACTATE AND CALCIUM CHLORIDE: 600; 310; 30; 20 INJECTION, SOLUTION INTRAVENOUS at 14:14

## 2025-08-25 RX ADMIN — BUPIVACAINE HYDROCHLORIDE 1.4 ML: 7.5 INJECTION, SOLUTION EPIDURAL; RETROBULBAR at 14:41

## 2025-08-25 RX ADMIN — CEFAZOLIN 2 G: 1 INJECTION, POWDER, FOR SOLUTION INTRAMUSCULAR; INTRAVENOUS at 14:29

## 2025-08-25 RX ADMIN — KETOROLAC TROMETHAMINE 30 MG: 30 INJECTION, SOLUTION INTRAMUSCULAR; INTRAVENOUS at 15:29

## 2025-08-25 RX ADMIN — METOCLOPRAMIDE 10 MG: 5 INJECTION, SOLUTION INTRAMUSCULAR; INTRAVENOUS at 15:05

## 2025-08-25 RX ADMIN — FAMOTIDINE 20 MG: 10 INJECTION, SOLUTION INTRAVENOUS at 14:12

## 2025-08-25 SDOH — SOCIAL STABILITY: SOCIAL INSECURITY: HAVE YOU HAD ANY THOUGHTS OF HARMING ANYONE ELSE?: NO

## 2025-08-25 SDOH — HEALTH STABILITY: MENTAL HEALTH: SUICIDAL BEHAVIOR (LIFETIME): NO

## 2025-08-25 SDOH — HEALTH STABILITY: MENTAL HEALTH: WISH TO BE DEAD (PAST 1 MONTH): NO

## 2025-08-25 SDOH — SOCIAL STABILITY: SOCIAL INSECURITY
WITHIN THE LAST YEAR, HAVE YOU BEEN KICKED, HIT, SLAPPED, OR OTHERWISE PHYSICALLY HURT BY YOUR PARTNER OR EX-PARTNER?: NO

## 2025-08-25 SDOH — HEALTH STABILITY: MENTAL HEALTH: HAVE YOU USED ANY SUBSTANCES (CANABIS, COCAINE, HEROIN, HALLUCINOGENS, INHALANTS, ETC.) IN THE PAST 12 MONTHS?: NO

## 2025-08-25 SDOH — SOCIAL STABILITY: SOCIAL INSECURITY: HAS ANYONE EVER THREATENED TO HURT YOUR FAMILY OR YOUR PETS?: NO

## 2025-08-25 SDOH — SOCIAL STABILITY: SOCIAL INSECURITY: VERBAL ABUSE: DENIES

## 2025-08-25 SDOH — ECONOMIC STABILITY: FOOD INSECURITY: HOW HARD IS IT FOR YOU TO PAY FOR THE VERY BASICS LIKE FOOD, HOUSING, MEDICAL CARE, AND HEATING?: NOT VERY HARD

## 2025-08-25 SDOH — SOCIAL STABILITY: SOCIAL INSECURITY: WITHIN THE LAST YEAR, HAVE YOU BEEN HUMILIATED OR EMOTIONALLY ABUSED IN OTHER WAYS BY YOUR PARTNER OR EX-PARTNER?: NO

## 2025-08-25 SDOH — HEALTH STABILITY: MENTAL HEALTH: HAVE YOU USED ANY PRESCRIPTION DRUGS OTHER THAN PRESCRIBED IN THE PAST 12 MONTHS?: NO

## 2025-08-25 SDOH — ECONOMIC STABILITY: FOOD INSECURITY: WITHIN THE PAST 12 MONTHS, THE FOOD YOU BOUGHT JUST DIDN'T LAST AND YOU DIDN'T HAVE MONEY TO GET MORE.: NEVER TRUE

## 2025-08-25 SDOH — SOCIAL STABILITY: SOCIAL INSECURITY: DOES ANYONE TRY TO KEEP YOU FROM HAVING/CONTACTING OTHER FRIENDS OR DOING THINGS OUTSIDE YOUR HOME?: NO

## 2025-08-25 SDOH — SOCIAL STABILITY: SOCIAL INSECURITY
WITHIN THE LAST YEAR, HAVE YOU BEEN RAPED OR FORCED TO HAVE ANY KIND OF SEXUAL ACTIVITY BY YOUR PARTNER OR EX-PARTNER?: NO

## 2025-08-25 SDOH — SOCIAL STABILITY: SOCIAL INSECURITY: WITHIN THE LAST YEAR, HAVE YOU BEEN AFRAID OF YOUR PARTNER OR EX-PARTNER?: NO

## 2025-08-25 SDOH — ECONOMIC STABILITY: FOOD INSECURITY: WITHIN THE PAST 12 MONTHS, YOU WORRIED THAT YOUR FOOD WOULD RUN OUT BEFORE YOU GOT THE MONEY TO BUY MORE.: NEVER TRUE

## 2025-08-25 SDOH — HEALTH STABILITY: MENTAL HEALTH: NON-SPECIFIC ACTIVE SUICIDAL THOUGHTS (PAST 1 MONTH): NO

## 2025-08-25 SDOH — HEALTH STABILITY: MENTAL HEALTH
DO YOU FEEL STRESS - TENSE, RESTLESS, NERVOUS, OR ANXIOUS, OR UNABLE TO SLEEP AT NIGHT BECAUSE YOUR MIND IS TROUBLED ALL THE TIME - THESE DAYS?: NOT AT ALL

## 2025-08-25 SDOH — SOCIAL STABILITY: SOCIAL INSECURITY: ABUSE SCREEN: ADULT

## 2025-08-25 SDOH — ECONOMIC STABILITY: HOUSING INSECURITY: DO YOU FEEL UNSAFE GOING BACK TO THE PLACE WHERE YOU ARE LIVING?: NO

## 2025-08-25 SDOH — SOCIAL STABILITY: SOCIAL INSECURITY: ARE THERE ANY APPARENT SIGNS OF INJURIES/BEHAVIORS THAT COULD BE RELATED TO ABUSE/NEGLECT?: NO

## 2025-08-25 SDOH — ECONOMIC STABILITY: TRANSPORTATION INSECURITY: IN THE PAST 12 MONTHS, HAS LACK OF TRANSPORTATION KEPT YOU FROM MEDICAL APPOINTMENTS OR FROM GETTING MEDICATIONS?: NO

## 2025-08-25 SDOH — SOCIAL STABILITY: SOCIAL INSECURITY: PHYSICAL ABUSE: DENIES

## 2025-08-25 SDOH — HEALTH STABILITY: MENTAL HEALTH: WERE YOU ABLE TO COMPLETE ALL THE BEHAVIORAL HEALTH SCREENINGS?: YES

## 2025-08-25 SDOH — SOCIAL STABILITY: SOCIAL INSECURITY: DO YOU FEEL ANYONE HAS EXPLOITED OR TAKEN ADVANTAGE OF YOU FINANCIALLY OR OF YOUR PERSONAL PROPERTY?: NO

## 2025-08-25 SDOH — SOCIAL STABILITY: SOCIAL INSECURITY: ARE YOU OR HAVE YOU BEEN THREATENED OR ABUSED PHYSICALLY, EMOTIONALLY, OR SEXUALLY BY ANYONE?: NO

## 2025-08-25 SDOH — SOCIAL STABILITY: SOCIAL INSECURITY: HAVE YOU HAD THOUGHTS OF HARMING ANYONE ELSE?: NO

## 2025-08-25 ASSESSMENT — LIFESTYLE VARIABLES
HOW OFTEN DO YOU HAVE A DRINK CONTAINING ALCOHOL: NEVER
AUDIT-C TOTAL SCORE: 0
HOW MANY STANDARD DRINKS CONTAINING ALCOHOL DO YOU HAVE ON A TYPICAL DAY: PATIENT DOES NOT DRINK
HOW OFTEN DO YOU HAVE 6 OR MORE DRINKS ON ONE OCCASION: NEVER
SKIP TO QUESTIONS 9-10: 1
AUDIT-C TOTAL SCORE: 0

## 2025-08-25 ASSESSMENT — PAIN SCALES - GENERAL
PAINLEVEL_OUTOF10: 0 - NO PAIN

## 2025-08-25 ASSESSMENT — PATIENT HEALTH QUESTIONNAIRE - PHQ9
SUM OF ALL RESPONSES TO PHQ9 QUESTIONS 1 & 2: 0
2. FEELING DOWN, DEPRESSED OR HOPELESS: NOT AT ALL
1. LITTLE INTEREST OR PLEASURE IN DOING THINGS: NOT AT ALL

## 2025-08-25 ASSESSMENT — ACTIVITIES OF DAILY LIVING (ADL): LACK_OF_TRANSPORTATION: NO

## 2025-08-26 ASSESSMENT — PAIN SCALES - GENERAL
PAIN_LEVEL: 0
PAINLEVEL_OUTOF10: 0 - NO PAIN
PAINLEVEL_OUTOF10: 5 - MODERATE PAIN

## 2025-08-27 ASSESSMENT — PAIN SCALES - GENERAL
PAINLEVEL_OUTOF10: 7
PAINLEVEL_OUTOF10: 0 - NO PAIN
PAINLEVEL_OUTOF10: 5 - MODERATE PAIN
PAINLEVEL_OUTOF10: 4
PAINLEVEL_OUTOF10: 5 - MODERATE PAIN

## 2025-08-27 ASSESSMENT — PAIN - FUNCTIONAL ASSESSMENT
PAIN_FUNCTIONAL_ASSESSMENT: 0-10

## 2025-08-27 ASSESSMENT — PAIN DESCRIPTION - LOCATION
LOCATION: INCISION

## 2025-08-27 ASSESSMENT — PAIN DESCRIPTION - DESCRIPTORS
DESCRIPTORS: SHARP;SORE
DESCRIPTORS: SORE
DESCRIPTORS: DISCOMFORT;SORE

## 2025-08-28 ASSESSMENT — PAIN SCALES - GENERAL
PAINLEVEL_OUTOF10: 4
PAINLEVEL_OUTOF10: 3
PAINLEVEL_OUTOF10: 2

## 2025-08-28 ASSESSMENT — PAIN DESCRIPTION - DESCRIPTORS: DESCRIPTORS: DISCOMFORT;SORE

## 2025-09-02 ENCOUNTER — CLINICAL SUPPORT (OUTPATIENT)
Dept: OBSTETRICS AND GYNECOLOGY | Facility: HOSPITAL | Age: 30
End: 2025-09-02
Payer: COMMERCIAL

## 2025-09-02 VITALS
SYSTOLIC BLOOD PRESSURE: 113 MMHG | HEIGHT: 60 IN | DIASTOLIC BLOOD PRESSURE: 80 MMHG | WEIGHT: 110 LBS | BODY MASS INDEX: 21.6 KG/M2 | HEART RATE: 77 BPM

## 2025-09-02 PROCEDURE — 0503F POSTPARTUM CARE VISIT: CPT

## 2025-09-02 PROCEDURE — 99211 OFF/OP EST MAY X REQ PHY/QHP: CPT

## 2025-09-02 ASSESSMENT — EDINBURGH POSTNATAL DEPRESSION SCALE (EPDS)
I HAVE BLAMED MYSELF UNNECESSARILY WHEN THINGS WENT WRONG: NOT VERY OFTEN
I HAVE BEEN SO UNHAPPY THAT I HAVE HAD DIFFICULTY SLEEPING: NOT AT ALL
I HAVE BEEN SO UNHAPPY THAT I HAVE BEEN CRYING: ONLY OCCASIONALLY
I HAVE BEEN ANXIOUS OR WORRIED FOR NO GOOD REASON: HARDLY EVER
TOTAL SCORE: 4
THINGS HAVE BEEN GETTING ON TOP OF ME: NO, MOST OF THE TIME I HAVE COPED QUITE WELL
THE THOUGHT OF HARMING MYSELF HAS OCCURRED TO ME: NEVER
I HAVE FELT SAD OR MISERABLE: NO, NOT AT ALL
I HAVE LOOKED FORWARD WITH ENJOYMENT TO THINGS: AS MUCH AS I EVER DID
I HAVE BEEN ABLE TO LAUGH AND SEE THE FUNNY SIDE OF THINGS: AS MUCH AS I ALWAYS COULD
I HAVE FELT SCARED OR PANICKY FOR NO GOOD REASON: NO, NOT AT ALL

## 2025-09-02 ASSESSMENT — PAIN SCALES - GENERAL: PAINLEVEL_OUTOF10: 1

## 2025-10-02 ENCOUNTER — APPOINTMENT (OUTPATIENT)
Dept: OBSTETRICS AND GYNECOLOGY | Facility: HOSPITAL | Age: 30
End: 2025-10-02
Payer: COMMERCIAL

## (undated) DEVICE — LIGASURE, SEALER/DIVIDER MARYLAND JAW, 5MM

## (undated) DEVICE — SUTURE, VICRYL, 0, 27 IN, UR-6, VIOLET

## (undated) DEVICE — DRAPE, PAD, PREP, W/ 9 IN CUFF, 24 X 41, LF, NS

## (undated) DEVICE — TUBING, SUCTION, VACUUM, INTRAUTERINE, CURETTAGE, HANDLE, MALE ADAPTER, 6 FT X 0.375 IN

## (undated) DEVICE — BAG, TISSUE RETRIEVAL, 10MM, ANCHOR

## (undated) DEVICE — CLIP, ENDO APPLIER LIGAMAX 5MM

## (undated) DEVICE — MANIFOLD, 4 PORT NEPTUNE STANDARD

## (undated) DEVICE — COVER, TABLE, 44 X 75 IN, DISPOSABLE, LF, STERILE

## (undated) DEVICE — APPLICATOR, CHLORAPREP, W/ORANGE TINT, 26ML

## (undated) DEVICE — CATHETER, DRAINAGE, NASOGASTRIC, DOUBLE LUMEN, FUNNEL END, SUMP, SALEM, W/ANTI-RELAX VALVE, 16 FR, 48 IN, PVC, STERILE

## (undated) DEVICE — TRAY, MINOR, SINGLE BASIN, STERILE

## (undated) DEVICE — POSITIONING, THE PINK PAD, PIGAZZI SYSTEM

## (undated) DEVICE — HOLSTER, JET SAFETY

## (undated) DEVICE — PREP TRAY, SKIN, DRY, W/GLOVES

## (undated) DEVICE — Device

## (undated) DEVICE — DRESSING, TRANSPARENT, TEGADERM, 2-3/8 X 2-3/4 IN

## (undated) DEVICE — SYSTEM, SMOKE EVACUATION LAPAROSCOPIC SEECLEAR MAX

## (undated) DEVICE — TROCAR, OPTICAL BLADELESS 5MM X 100 W/ADVANCED FIXATION

## (undated) DEVICE — SUTURE, PDS, 0, 18 IN, LIGATING LOOP, VIOLET

## (undated) DEVICE — PUMP, STRYKERFLOW 2 & HANDPIECE W/10FT. IRRIGATION TUBING

## (undated) DEVICE — REST, HEAD, BAGEL, 9 IN

## (undated) DEVICE — ELECTRODE, OPTI2 LAPAROSCOPIC SPATULA, CURVED

## (undated) DEVICE — TOWEL, SURGICAL, NEURO, O/R, 16 X 26, BLUE, STERILE

## (undated) DEVICE — TROCAR SYSTEM, BALLOON, KII GELPORT, 12 X 100MM

## (undated) DEVICE — SUTURE, VICRYL, 4-0, 18 IN, UNDYED BR PS-2

## (undated) DEVICE — COVER, CART, 45 X 27 X 48 IN, CLEAR